# Patient Record
Sex: FEMALE | Race: WHITE | NOT HISPANIC OR LATINO | Employment: PART TIME
[De-identification: names, ages, dates, MRNs, and addresses within clinical notes are randomized per-mention and may not be internally consistent; named-entity substitution may affect disease eponyms.]

---

## 2018-03-13 ENCOUNTER — RECORDS - HEALTHEAST (OUTPATIENT)
Dept: ADMINISTRATIVE | Facility: OTHER | Age: 43
End: 2018-03-13

## 2018-03-14 ENCOUNTER — COMMUNICATION - HEALTHEAST (OUTPATIENT)
Dept: TELEHEALTH | Facility: CLINIC | Age: 43
End: 2018-03-14

## 2018-03-14 ENCOUNTER — RECORDS - HEALTHEAST (OUTPATIENT)
Dept: BONE DENSITY | Facility: CLINIC | Age: 43
End: 2018-03-14

## 2018-03-14 ENCOUNTER — RECORDS - HEALTHEAST (OUTPATIENT)
Dept: ADMINISTRATIVE | Facility: OTHER | Age: 43
End: 2018-03-14

## 2018-03-14 DIAGNOSIS — K90.0 CELIAC DISEASE: ICD-10-CM

## 2018-12-13 ENCOUNTER — RECORDS - HEALTHEAST (OUTPATIENT)
Dept: LAB | Facility: CLINIC | Age: 43
End: 2018-12-13

## 2018-12-15 LAB — BACTERIA SPEC CULT: NORMAL

## 2019-07-17 ENCOUNTER — ANESTHESIA - HEALTHEAST (OUTPATIENT)
Dept: SURGERY | Facility: HOSPITAL | Age: 44
End: 2019-07-17

## 2019-07-17 ENCOUNTER — SURGERY - HEALTHEAST (OUTPATIENT)
Dept: SURGERY | Facility: HOSPITAL | Age: 44
End: 2019-07-17

## 2019-07-17 ASSESSMENT — MIFFLIN-ST. JEOR: SCORE: 1615.23

## 2019-07-18 ENCOUNTER — COMMUNICATION - HEALTHEAST (OUTPATIENT)
Dept: SURGERY | Facility: CLINIC | Age: 44
End: 2019-07-18

## 2021-05-30 NOTE — ANESTHESIA PREPROCEDURE EVALUATION
Anesthesia Evaluation      Patient summary reviewed   No history of anesthetic complications     Airway   Mallampati: II   Pulmonary - normal exam   (+) asthma  a smoker (current every day smoker)                         Cardiovascular - negative ROS and normal exam  Exercise tolerance: > or = 4 METS  Rhythm: regular  Rate: normal,         Neuro/Psych - negative ROS     Endo/Other    (+) obesity (BMI 36.89),      GI/Hepatic/Renal - negative ROS      Other findings: Labs 7/17/19:  WBC 16.5, Hgb 13.5, Plt 244  Na 138, K 4.1, BUN 12, Cr 0.82      Dental - normal exam                        Anesthesia Plan  Planned anesthetic: general endotracheal  GETA.  Hx of PONV and plan for scopolamine patch, dexamethasone, zofran and low-dose propofol gtt (25-50 mcg/kg/min).  Ketorolac 30 mg at EOC.  ASA 2   Induction: intravenous   Anesthetic plan and risks discussed with: patient  Anesthesia plan special considerations: increased risk of difficult airway,   Post-op plan: routine recovery

## 2021-05-30 NOTE — ANESTHESIA POSTPROCEDURE EVALUATION
Patient: Rita Soares  CHOLECYSTECTOMY, LAPAROSCOPIC  Anesthesia type: general    Patient location: PACU  Last vitals:   Vitals Value Taken Time   /85 7/17/2019  3:04 PM   Temp 36.9  C (98.4  F) 7/17/2019  3:00 PM   Pulse 74 7/17/2019  3:17 PM   Resp 16 7/17/2019  3:00 PM   SpO2 96 % 7/17/2019  3:17 PM   Vitals shown include unvalidated device data.  Post vital signs: stable  Level of consciousness: awake and responds to simple questions  Post-anesthesia pain: pain controlled  Post-anesthesia nausea and vomiting: no  Pulmonary: unassisted, return to baseline  Cardiovascular: stable and blood pressure at baseline  Hydration: adequate  Anesthetic events: no    QCDR Measures:  ASA# 11 - Basia-op Cardiac Arrest: ASA11B - Patient did NOT experience unanticipated cardiac arrest  ASA# 12 - Basia-op Mortality Rate: ASA12B - Patient did NOT die  ASA# 13 - PACU Re-Intubation Rate: ASA13B - Patient did NOT require a new airway mgmt  ASA# 10 - Composite Anes Safety: ASA10A - No serious adverse event    Additional Notes:

## 2021-05-30 NOTE — ANESTHESIA CARE TRANSFER NOTE
Last vitals:   Vitals:    07/17/19 1127   BP: 135/65   Pulse: 74   Resp: 18   Temp: 37  C (98.6  F)   SpO2: 96%     Patient's level of consciousness is drowsy  Spontaneous respirations: yes  Maintains airway independently: yes  Dentition unchanged: yes  Oropharynx: oropharynx clear of all foreign objects    QCDR Measures:  ASA# 20 - Surgical Safety Checklist: WHO surgical safety checklist completed prior to induction    PQRS# 430 - Adult PONV Prevention: 4558F - Pt received => 2 anti-emetic agents (different classes) preop & intraop  ASA# 8 - Peds PONV Prevention: NA - Not pediatric patient, not GA or 2 or more risk factors NOT present  PQRS# 424 - Basia-op Temp Management: 4559F - At least one body temp DOCUMENTED => 35.5C or 95.9F within required timeframe  PQRS# 426 - PACU Transfer Protocol: - Transfer of care checklist used  ASA# 14 - Acute Post-op Pain: ASA14B - Patient did NOT experience pain >= 7 out of 10

## 2021-06-03 VITALS — WEIGHT: 215 LBS | BODY MASS INDEX: 36.7 KG/M2 | HEIGHT: 64 IN

## 2021-06-19 NOTE — LETTER
Letter by Meghan Palma RN at      Author: Meghan Palma RN Service: -- Author Type: --    Filed:  Encounter Date: 7/18/2019 Status: (Other)         July 18, 2019     Patient: Rita Soares   YOB: 1975   Date of Visit: 7/18/2019       To Whom It May Concern:    It is my medical opinion that Rita Soares can go back to work on 7/21/19 with a weight restriction of 10lbs and no twisting until August 1, 2019.     If you have any questions or concerns, please don't hesitate to call.    Sincerely,        Electronically signed by Chriss Carter MD

## 2021-07-04 ENCOUNTER — HEALTH MAINTENANCE LETTER (OUTPATIENT)
Age: 46
End: 2021-07-04

## 2021-10-24 ENCOUNTER — HEALTH MAINTENANCE LETTER (OUTPATIENT)
Age: 46
End: 2021-10-24

## 2022-07-31 ENCOUNTER — HEALTH MAINTENANCE LETTER (OUTPATIENT)
Age: 47
End: 2022-07-31

## 2022-09-08 ENCOUNTER — HOSPITAL ENCOUNTER (OUTPATIENT)
Dept: MAMMOGRAPHY | Facility: CLINIC | Age: 47
Discharge: HOME | End: 2022-09-08
Attending: FAMILY MEDICINE | Admitting: FAMILY MEDICINE
Payer: COMMERCIAL

## 2022-09-08 DIAGNOSIS — Z12.31 VISIT FOR SCREENING MAMMOGRAM: ICD-10-CM

## 2022-09-08 PROCEDURE — 77067 SCR MAMMO BI INCL CAD: CPT

## 2022-09-22 ENCOUNTER — ANCILLARY PROCEDURE (OUTPATIENT)
Dept: MAMMOGRAPHY | Facility: CLINIC | Age: 47
End: 2022-09-22
Attending: FAMILY MEDICINE
Payer: COMMERCIAL

## 2022-09-22 DIAGNOSIS — N64.89 BREAST ASYMMETRY: ICD-10-CM

## 2022-09-22 PROCEDURE — 77061 BREAST TOMOSYNTHESIS UNI: CPT | Mod: RT

## 2022-09-22 PROCEDURE — 76642 ULTRASOUND BREAST LIMITED: CPT | Mod: RT

## 2022-10-15 ENCOUNTER — HEALTH MAINTENANCE LETTER (OUTPATIENT)
Age: 47
End: 2022-10-15

## 2022-12-01 LAB
FLUAV RNA SPEC QL NAA+PROBE: NEGATIVE
FLUBV RNA RESP QL NAA+PROBE: NEGATIVE
RSV RNA SPEC NAA+PROBE: NEGATIVE
SARS-COV-2 RNA RESP QL NAA+PROBE: NEGATIVE

## 2022-12-01 PROCEDURE — 93005 ELECTROCARDIOGRAM TRACING: CPT | Performed by: EMERGENCY MEDICINE

## 2022-12-01 PROCEDURE — 87637 SARSCOV2&INF A&B&RSV AMP PRB: CPT | Performed by: EMERGENCY MEDICINE

## 2022-12-01 PROCEDURE — 99285 EMERGENCY DEPT VISIT HI MDM: CPT | Mod: 25

## 2022-12-01 PROCEDURE — C9803 HOPD COVID-19 SPEC COLLECT: HCPCS

## 2022-12-02 ENCOUNTER — APPOINTMENT (OUTPATIENT)
Dept: RADIOLOGY | Facility: CLINIC | Age: 47
End: 2022-12-02
Attending: EMERGENCY MEDICINE
Payer: COMMERCIAL

## 2022-12-02 ENCOUNTER — APPOINTMENT (OUTPATIENT)
Dept: CT IMAGING | Facility: CLINIC | Age: 47
End: 2022-12-02
Attending: EMERGENCY MEDICINE
Payer: COMMERCIAL

## 2022-12-02 ENCOUNTER — HOSPITAL ENCOUNTER (EMERGENCY)
Facility: CLINIC | Age: 47
Discharge: HOME | End: 2022-12-02
Attending: EMERGENCY MEDICINE | Admitting: EMERGENCY MEDICINE
Payer: COMMERCIAL

## 2022-12-02 VITALS
SYSTOLIC BLOOD PRESSURE: 147 MMHG | BODY MASS INDEX: 32.44 KG/M2 | RESPIRATION RATE: 20 BRPM | OXYGEN SATURATION: 99 % | HEIGHT: 64 IN | HEART RATE: 99 BPM | WEIGHT: 190 LBS | TEMPERATURE: 97.1 F | DIASTOLIC BLOOD PRESSURE: 79 MMHG

## 2022-12-02 DIAGNOSIS — J20.8 ACUTE BRONCHITIS DUE TO OTHER SPECIFIED ORGANISMS: ICD-10-CM

## 2022-12-02 LAB
ALBUMIN SERPL-MCNC: 3.8 G/DL (ref 3.5–5)
ALP SERPL-CCNC: 86 U/L (ref 45–120)
ALT SERPL W P-5'-P-CCNC: 18 U/L (ref 0–45)
ANION GAP SERPL CALCULATED.3IONS-SCNC: 8 MMOL/L (ref 5–18)
AST SERPL W P-5'-P-CCNC: 11 U/L (ref 0–40)
ATRIAL RATE - MUSE: 86 BPM
BASOPHILS # BLD AUTO: 0.1 10E3/UL (ref 0–0.2)
BASOPHILS NFR BLD AUTO: 0 %
BILIRUB SERPL-MCNC: 0.1 MG/DL (ref 0–1)
BUN SERPL-MCNC: 20 MG/DL (ref 8–22)
CALCIUM SERPL-MCNC: 8.4 MG/DL (ref 8.5–10.5)
CHLORIDE BLD-SCNC: 106 MMOL/L (ref 98–107)
CO2 SERPL-SCNC: 24 MMOL/L (ref 22–31)
CREAT SERPL-MCNC: 0.78 MG/DL (ref 0.6–1.1)
DIASTOLIC BLOOD PRESSURE - MUSE: NORMAL MMHG
EOSINOPHIL # BLD AUTO: 0.1 10E3/UL (ref 0–0.7)
EOSINOPHIL NFR BLD AUTO: 0 %
ERYTHROCYTE [DISTWIDTH] IN BLOOD BY AUTOMATED COUNT: 14 % (ref 10–15)
GFR SERPL CREATININE-BSD FRML MDRD: >90 ML/MIN/1.73M2
GLUCOSE BLD-MCNC: 122 MG/DL (ref 70–125)
HCT VFR BLD AUTO: 45.7 % (ref 35–47)
HGB BLD-MCNC: 15.2 G/DL (ref 11.7–15.7)
IMM GRANULOCYTES # BLD: 0.2 10E3/UL
IMM GRANULOCYTES NFR BLD: 1 %
INTERPRETATION ECG - MUSE: NORMAL
LIPASE SERPL-CCNC: 12 U/L (ref 0–52)
LYMPHOCYTES # BLD AUTO: 1.9 10E3/UL (ref 0.8–5.3)
LYMPHOCYTES NFR BLD AUTO: 8 %
MCH RBC QN AUTO: 30.8 PG (ref 26.5–33)
MCHC RBC AUTO-ENTMCNC: 33.3 G/DL (ref 31.5–36.5)
MCV RBC AUTO: 93 FL (ref 78–100)
MONOCYTES # BLD AUTO: 0.6 10E3/UL (ref 0–1.3)
MONOCYTES NFR BLD AUTO: 3 %
NEUTROPHILS # BLD AUTO: 21.2 10E3/UL (ref 1.6–8.3)
NEUTROPHILS NFR BLD AUTO: 88 %
NRBC # BLD AUTO: 0 10E3/UL
NRBC BLD AUTO-RTO: 0 /100
P AXIS - MUSE: 65 DEGREES
PLATELET # BLD AUTO: 281 10E3/UL (ref 150–450)
POTASSIUM BLD-SCNC: 4.4 MMOL/L (ref 3.5–5)
PR INTERVAL - MUSE: 136 MS
PROT SERPL-MCNC: 6.3 G/DL (ref 6–8)
QRS DURATION - MUSE: 92 MS
QT - MUSE: 366 MS
QTC - MUSE: 437 MS
R AXIS - MUSE: 31 DEGREES
RBC # BLD AUTO: 4.93 10E6/UL (ref 3.8–5.2)
SODIUM SERPL-SCNC: 138 MMOL/L (ref 136–145)
SYSTOLIC BLOOD PRESSURE - MUSE: NORMAL MMHG
T AXIS - MUSE: 13 DEGREES
TROPONIN I SERPL-MCNC: <0.01 NG/ML (ref 0–0.29)
VENTRICULAR RATE- MUSE: 86 BPM
WBC # BLD AUTO: 24 10E3/UL (ref 4–11)

## 2022-12-02 PROCEDURE — 84484 ASSAY OF TROPONIN QUANT: CPT | Performed by: EMERGENCY MEDICINE

## 2022-12-02 PROCEDURE — 250N000009 HC RX 250: Performed by: EMERGENCY MEDICINE

## 2022-12-02 PROCEDURE — 85025 COMPLETE CBC W/AUTO DIFF WBC: CPT | Performed by: EMERGENCY MEDICINE

## 2022-12-02 PROCEDURE — 250N000011 HC RX IP 250 OP 636: Performed by: EMERGENCY MEDICINE

## 2022-12-02 PROCEDURE — 71045 X-RAY EXAM CHEST 1 VIEW: CPT

## 2022-12-02 PROCEDURE — 94640 AIRWAY INHALATION TREATMENT: CPT

## 2022-12-02 PROCEDURE — 83690 ASSAY OF LIPASE: CPT | Performed by: EMERGENCY MEDICINE

## 2022-12-02 PROCEDURE — 71275 CT ANGIOGRAPHY CHEST: CPT

## 2022-12-02 PROCEDURE — 271N000002 HC RX 271: Performed by: EMERGENCY MEDICINE

## 2022-12-02 PROCEDURE — 36415 COLL VENOUS BLD VENIPUNCTURE: CPT | Performed by: EMERGENCY MEDICINE

## 2022-12-02 PROCEDURE — 80053 COMPREHEN METABOLIC PANEL: CPT | Performed by: EMERGENCY MEDICINE

## 2022-12-02 RX ORDER — IOPAMIDOL 755 MG/ML
75 INJECTION, SOLUTION INTRAVASCULAR ONCE
Status: COMPLETED | OUTPATIENT
Start: 2022-12-02 | End: 2022-12-02

## 2022-12-02 RX ORDER — ALBUTEROL SULFATE 0.83 MG/ML
2.5 SOLUTION RESPIRATORY (INHALATION)
Status: DISCONTINUED | OUTPATIENT
Start: 2022-12-02 | End: 2022-12-02 | Stop reason: HOSPADM

## 2022-12-02 RX ORDER — INHALER, ASSIST DEVICES
1 SPACER (EA) MISCELLANEOUS ONCE
Status: COMPLETED | OUTPATIENT
Start: 2022-12-02 | End: 2022-12-02

## 2022-12-02 RX ORDER — IPRATROPIUM BROMIDE AND ALBUTEROL SULFATE 2.5; .5 MG/3ML; MG/3ML
1 SOLUTION RESPIRATORY (INHALATION) EVERY 6 HOURS PRN
Qty: 90 ML | Refills: 0 | Status: SHIPPED | OUTPATIENT
Start: 2022-12-02

## 2022-12-02 RX ORDER — IPRATROPIUM BROMIDE AND ALBUTEROL SULFATE 2.5; .5 MG/3ML; MG/3ML
3 SOLUTION RESPIRATORY (INHALATION) ONCE
Status: COMPLETED | OUTPATIENT
Start: 2022-12-02 | End: 2022-12-02

## 2022-12-02 RX ORDER — FLUCONAZOLE 150 MG/1
150 TABLET ORAL ONCE
Qty: 2 TABLET | Refills: 0 | Status: SHIPPED | OUTPATIENT
Start: 2022-12-02 | End: 2022-12-02

## 2022-12-02 RX ORDER — ALBUTEROL SULFATE 90 UG/1
2 AEROSOL, METERED RESPIRATORY (INHALATION) EVERY 6 HOURS PRN
Qty: 18 G | Refills: 0 | Status: SHIPPED | OUTPATIENT
Start: 2022-12-02

## 2022-12-02 RX ORDER — METHYLPREDNISOLONE SODIUM SUCCINATE 125 MG/2ML
125 INJECTION, POWDER, LYOPHILIZED, FOR SOLUTION INTRAMUSCULAR; INTRAVENOUS ONCE
Status: COMPLETED | OUTPATIENT
Start: 2022-12-02 | End: 2022-12-02

## 2022-12-02 RX ADMIN — Medication 1 EACH: at 03:42

## 2022-12-02 RX ADMIN — METHYLPREDNISOLONE SODIUM SUCCINATE 125 MG: 125 INJECTION, POWDER, FOR SOLUTION INTRAMUSCULAR; INTRAVENOUS at 02:08

## 2022-12-02 RX ADMIN — IOPAMIDOL 75 ML: 755 INJECTION, SOLUTION INTRAVENOUS at 02:50

## 2022-12-02 RX ADMIN — IPRATROPIUM BROMIDE AND ALBUTEROL SULFATE 3 ML: 2.5; .5 SOLUTION RESPIRATORY (INHALATION) at 02:58

## 2022-12-02 ASSESSMENT — ENCOUNTER SYMPTOMS
WHEEZING: 1
COUGH: 1
CHEST TIGHTNESS: 1

## 2022-12-02 ASSESSMENT — ACTIVITIES OF DAILY LIVING (ADL): ADLS_ACUITY_SCORE: 35

## 2022-12-02 NOTE — ED TRIAGE NOTES
Pt presents to the ED with c/o worsening chest pain. Pt had cough for the past 2 weeks and then developed worsening mid sternal CP this evening. Worse with laying down. Denies fevers.

## 2022-12-02 NOTE — ED PROVIDER NOTES
NAME: Rita Soares  AGE: 47 year old female  YOB: 1975  MRN: [id number]  EVALUATION DATE & TIME: 12/2/2022  1:19 AM    PCP: Meghan Keenan    ED PROVIDER: Mando Clinton M.D.      Chief Complaint   Patient presents with     Chest Pain     Cough     FINAL IMPRESSION:  1. Acute bronchitis due to other specified organisms      MEDICAL DECISION MAKING:    3:37 AM I met with the patient, obtained history, performed an initial exam, and discussed options and plan for diagnostics and treatment here in the ED.   Patient was clinically assessed and consented to treatment. After assessment, medical decision making and workup were discussed with the patient. The patient was agreeable to plan for testing, workup, and treatment.  Pertinent Labs & Imaging studies reviewed. (See chart for details)       Medical Decision Making    History:    Supplemental history from: N/A    External Record(s) reviewed: Documented in HPI, if applicable.    Work Up:    Chart documentation includes differential considered and any EKGs or imaging interpreted by provider.    In additional to work up documented, I considered the following work up: See chart documentation, if applicable.    External consultation:    Discussion of management with another provider: See chart documentation, if applicable    Complicating factors:    Care impacted by chronic illness: None    Care affected by social determinants of health: N/A    Disposition considerations: Discharge. I prescribed additional prescription strength medication(s) as charted. I considered admission, but discharged patient after significant clinical improvement.    Rita Soares is a 47 year old female who presents with chest pain and cough.   Differential diagnosis includes but not limited to COPD exacerbation, bronchitis, pneumonia, asthma exacerbation, pulm embolism, acute coronary syndrome..  Patient reports being otherwise healthy however does smoke and likely  with COPD.  She has had 2 months of cough that is been dry and persistent.  Patient has seen her primary doctor and started a prednisone course with azithromycin 2 weeks ago however cough persisted.  She then was recently started on a prolonged steroid course with taper which she is currently in the middle of along with doxycycline and inhalers.  She reports continued cough but no fevers, no chest pain but a little bit of chest tightness and intermittent wheezing or raspy breathing.  She denies any acute shortness of breath or lightheadedness.  Patient is still smoking and feels her symptoms are likely consistent with acute on chronic bronchitis.  Patient is on steroid course presently however has trouble with inhalers and do not feel they help.  She is also on doxycycline which would be appropriate for this.  Patient given DuoNeb and Solu-Medrol with resolution of wheezes and prolonged expiration with no further chest tightness.  EKG from triage was unremarkable for any acute ischemia except for PVC noted and troponin was negative, metabolic panel normal, CBC did show leukocytosis though patient has had been on 2 courses steroids and now is on prolonged taper after the prior course.  Lipase was normal and chest x-ray was clear.  Patient could possibly pulm embolism will be sent for CTA.  Patient reassured and following nebulized treatments she was feeling resolution of her symptoms.  She felt much better and was watched for any rebound.  Solu-Medrol prevent rebound and after negative CTA patient was reassured.  She would like to continue with such treatment at home and I did write her for a nebulizer and ampules.  As well we discussed her inhaler use which she reports not really having much effect from them that we discussed whether she uses a spacer which she does not and she is not familiar with how to use the inhaler and time it correctly.  She reports usually it just makes her mouth dry.  Spacer was given to  her to help with the inhaler and patient will continue her steroid course to finish this off as well as the doxycycline.  She will be given nebulizer to be used as needed along with follow-up to primary physician.    0 minutes of critical care time    MEDICATIONS GIVEN IN THE EMERGENCY:  Medications   albuterol (PROVENTIL) neb solution 2.5 mg (has no administration in time range)   ipratropium - albuterol 0.5 mg/2.5 mg/3 mL (DUONEB) neb solution 3 mL (3 mLs Nebulization Given 12/2/22 0258)   methylPREDNISolone sodium succinate (solu-MEDROL) injection 125 mg (125 mg Intravenous Given 12/2/22 0208)   iopamidol (ISOVUE-370) solution 75 mL (75 mLs Intravenous Given 12/2/22 0250)   aerochamber with mouthpiece (NO mask) - > 5 years 1 each (1 each Inhalation Given 12/2/22 0342)       NEW PRESCRIPTIONS STARTED AT TODAY'S ER VISIT:  Discharge Medication List as of 12/2/2022  3:48 AM      START taking these medications    Details   albuterol (PROAIR HFA/PROVENTIL HFA/VENTOLIN HFA) 108 (90 Base) MCG/ACT inhaler Inhale 2 puffs into the lungs every 6 hours as needed for shortness of breath / dyspnea or wheezing, Disp-18 g, R-0, Local PrintPharmacy may dispense brand covered by insurance (Proair, or proventil or ventolin or generic albuterol inhaler)      fluconazole (DIFLUCAN) 150 MG tablet Take 1 tablet (150 mg) by mouth once for 1 dose If symptoms not improved by day 3 after the first dose may take second dose., Disp-2 tablet, R-0, Local Print      ipratropium - albuterol 0.5 mg/2.5 mg/3 mL (DUONEB) 0.5-2.5 (3) MG/3ML neb solution Take 1 vial (3 mLs) by nebulization every 6 hours as needed for shortness of breath / dyspnea or wheezing, Disp-90 mL, R-0, Local Print      Respiratory Therapy Supplies (NEBULIZER) ASHANTI Inhale 1 ampule into the lungs 4 times daily as needed (Wheezing, cough, shortness of breath), Disp-1 each, R-0, Local PrintInclude tubing and mask for age please.                 =================================================================    HPI    Patient information was obtained from: Patient    Use of : N/A         Rita Soares is a 47 year old female with a past medical history of smoking, who presents chest pain and cough.  Patient does not relate chest pain but more by chest tightness.  Patient reports that for 2 months she has had dry hacking cough after being ill 2 months ago.  She reports no fevers or chills at this time, no sputum production, no hemoptysis, no leg swelling or tenderness.  She denies any long plane flights or car trips.  Patient does report seeing her primary clinic and was started on 5 days of prednisone along with azithromycin which she does not believe helped but after finishing that they continued on longer course of steroids where she is in the middle of today on presentation.  She is also on doxycycline that she is in the middle of the course and was given an inhaler.  Patient reports the inhaler does not seem to help but mainly just makes her mouth dry.  She does not believe it is effective but also notes wheezing and raspy breathing with prolonged expiration occasionally and feels the inhaler should help.  Patient reports she was started on treatment for bronchitis but not improving.      REVIEW OF SYSTEMS   Review of Systems   Respiratory: Positive for cough, chest tightness and wheezing.    All other systems reviewed and are negative.       PAST MEDICAL HISTORY:  No past medical history on file.    PAST SURGICAL HISTORY:  Past Surgical History:   Procedure Laterality Date     DILATION AND CURETTAGE       LAPAROSCOPIC CHOLECYSTECTOMY N/A 7/17/2019    Procedure: CHOLECYSTECTOMY, LAPAROSCOPIC;  Surgeon: Chriss Carter MD;  [facility]: [facility];  Service: General     LAPAROSCOPIC CYSTECTOMY OVARIAN (ONCOLOGY)         CURRENT MEDICATIONS:      Current Facility-Administered Medications:      albuterol (PROVENTIL) neb solution  2.5 mg, 2.5 mg, Nebulization, Once PRN, Mando Clinton MD    Current Outpatient Medications:      albuterol (PROAIR HFA/PROVENTIL HFA/VENTOLIN HFA) 108 (90 Base) MCG/ACT inhaler, Inhale 2 puffs into the lungs every 6 hours as needed for shortness of breath / dyspnea or wheezing, Disp: 18 g, Rfl: 0     fluconazole (DIFLUCAN) 150 MG tablet, Take 1 tablet (150 mg) by mouth once for 1 dose If symptoms not improved by day 3 after the first dose may take second dose., Disp: 2 tablet, Rfl: 0     ipratropium - albuterol 0.5 mg/2.5 mg/3 mL (DUONEB) 0.5-2.5 (3) MG/3ML neb solution, Take 1 vial (3 mLs) by nebulization every 6 hours as needed for shortness of breath / dyspnea or wheezing, Disp: 90 mL, Rfl: 0     Respiratory Therapy Supplies (NEBULIZER) ASHANTI, Inhale 1 ampule into the lungs 4 times daily as needed (Wheezing, cough, shortness of breath), Disp: 1 each, Rfl: 0     NO ACTIVE MEDICATIONS, , Disp: , Rfl:      Prenatal vitamin  s (DIS) TABS, Take 1 tablet by mouth daily, Disp: 90 tablet, Rfl: 3     varenicline (CHANTIX STARTING MONTH DONNELL) 0.5 MG X 11 & 1 MG X 42 tablet, Take 0.5 mg tab daily for 3 days, then 0.5 mg tab twice daily for 4 days, then 1 mg twice daily., Disp: 53 tablet, Rfl: 0     varenicline (CHANTIX) 1 MG tablet, Take 1 tablet by mouth 2 times daily., Disp: 56 tablet, Rfl: 2    ALLERGIES:  Allergies   Allergen Reactions     Gluten Meal Nausea and Vomiting and Diarrhea       FAMILY HISTORY:  No family history on file.    SOCIAL HISTORY:   Social History     Socioeconomic History     Marital status:    Tobacco Use     Smoking status: Every Day     Packs/day: 5.00     Years: 20.00     Pack years: 100.00     Types: Cigarettes     Smokeless tobacco: Current   Substance and Sexual Activity     Alcohol use: Never     Drug use: Never     Sexual activity: Yes     Partners: Male     Birth control/protection: Condom   Other Topics Concern     Parent/sibling w/ CABG, MI or angioplasty before 65F  "55M? No       PHYSICAL EXAM:    Vitals: BP (!) 147/79   Pulse 99   Temp 97.1  F (36.2  C) (Temporal)   Resp 20   Ht 1.626 m (5' 4\")   Wt 86.2 kg (190 lb)   SpO2 99%   BMI 32.61 kg/m     Physical Exam  Vitals and nursing note reviewed.   Constitutional:       General: She is not in acute distress.     Appearance: She is well-developed and normal weight. She is not ill-appearing or toxic-appearing.   Neck:      Vascular: No JVD.   Cardiovascular:      Rate and Rhythm: Normal rate and regular rhythm.      Heart sounds: Normal heart sounds.   Pulmonary:      Effort: Pulmonary effort is normal. No accessory muscle usage or respiratory distress.      Breath sounds: No stridor. Examination of the right-lower field reveals decreased breath sounds and wheezing. Examination of the left-lower field reveals decreased breath sounds and wheezing. Decreased breath sounds and wheezing present.   Chest:      Chest wall: No tenderness.   Abdominal:      Tenderness: There is no abdominal tenderness.   Musculoskeletal:      Right lower leg: No tenderness. No edema.      Left lower leg: No tenderness. No edema.   Skin:     Coloration: Skin is not cyanotic.   Neurological:      General: No focal deficit present.      Mental Status: She is alert.   Psychiatric:         Behavior: Behavior normal.           LAB:  All pertinent labs reviewed and interpreted.  Labs Ordered and Resulted from Time of ED Arrival to Time of ED Departure   COMPREHENSIVE METABOLIC PANEL - Abnormal       Result Value    Sodium 138      Potassium 4.4      Chloride 106      Carbon Dioxide (CO2) 24      Anion Gap 8      Urea Nitrogen 20      Creatinine 0.78      Calcium 8.4 (*)     Glucose 122      Alkaline Phosphatase 86      AST 11      ALT 18      Protein Total 6.3      Albumin 3.8      Bilirubin Total 0.1      GFR Estimate >90     CBC WITH PLATELETS AND DIFFERENTIAL - Abnormal    WBC Count 24.0 (*)     RBC Count 4.93      Hemoglobin 15.2      Hematocrit 45.7 "      MCV 93      MCH 30.8      MCHC 33.3      RDW 14.0      Platelet Count 281      % Neutrophils 88      % Lymphocytes 8      % Monocytes 3      % Eosinophils 0      % Basophils 0      % Immature Granulocytes 1      NRBCs per 100 WBC 0      Absolute Neutrophils 21.2 (*)     Absolute Lymphocytes 1.9      Absolute Monocytes 0.6      Absolute Eosinophils 0.1      Absolute Basophils 0.1      Absolute Immature Granulocytes 0.2      Absolute NRBCs 0.0     INFLUENZA A/B & SARS-COV2 PCR MULTIPLEX - Normal    Influenza A PCR Negative      Influenza B PCR Negative      RSV PCR Negative      SARS CoV2 PCR Negative     LIPASE - Normal    Lipase 12     TROPONIN I - Normal    Troponin I <0.01         RADIOLOGY:  CT Chest Pulmonary Embolism w Contrast   Final Result   IMPRESSION:   1.  No infiltrate, pleural effusion or pulmonary embolism.   2.  Subcentimeter biapical nodules.      REFERENCE:   Guidelines for Management of Incidental Pulmonary Nodules Detected on CT Images: From the Fleischner Society 2017.    Guidelines apply to incidental nodules in patients who are 35 years or older.   Guidelines do not apply to lung cancer screening, patients with immunosuppression, or patients with known primary cancer.      MULTIPLE NODULES   Nodule size <6 mm   Low-risk patients: No follow-up needed.   High-risk patients: Optional follow-up at 12 months.         XR Chest Port 1 View   Final Result   IMPRESSION: Negative chest.          EKG:   Performed at: 9:21 PM on December 2, 2022.  Impression: Sinus rhythm with PVC, no signs of acute ST elevation ischemia, no irregular rhythm.  Rate: 86 bpm  Rhythm: Sinus rhythm  QRS Interval: 92 ms  QTc Interval: 437 ms  Comparison: Comparison prior EKG from July 2019 with similar morphology with QRS of PVC new.  I have independently reviewed and interpreted the EKG(s) documented above.     PROCEDURES:   Procedures       Mando Clinton M.D.  Emergency Medicine  [facility]  Department       Wall, Mando Galvan MD  12/02/22 7135

## 2023-08-20 ENCOUNTER — HEALTH MAINTENANCE LETTER (OUTPATIENT)
Age: 48
End: 2023-08-20

## 2024-01-07 ENCOUNTER — HEALTH MAINTENANCE LETTER (OUTPATIENT)
Age: 49
End: 2024-01-07

## 2024-03-06 ENCOUNTER — APPOINTMENT (OUTPATIENT)
Dept: URBAN - METROPOLITAN AREA CLINIC 260 | Age: 49
Setting detail: DERMATOLOGY
End: 2024-03-06

## 2024-03-06 VITALS — WEIGHT: 190 LBS | HEIGHT: 64 IN

## 2024-03-06 DIAGNOSIS — L72.8 OTHER FOLLICULAR CYSTS OF THE SKIN AND SUBCUTANEOUS TISSUE: ICD-10-CM

## 2024-03-06 DIAGNOSIS — D22 MELANOCYTIC NEVI: ICD-10-CM

## 2024-03-06 PROBLEM — D22.39 MELANOCYTIC NEVI OF OTHER PARTS OF FACE: Status: ACTIVE | Noted: 2024-03-06

## 2024-03-06 PROBLEM — D23.61 OTHER BENIGN NEOPLASM OF SKIN OF RIGHT UPPER LIMB, INCLUDING SHOULDER: Status: ACTIVE | Noted: 2024-03-06

## 2024-03-06 PROCEDURE — 99203 OFFICE O/P NEW LOW 30 MIN: CPT

## 2024-03-06 PROCEDURE — OTHER PHOTO-DOCUMENTATION: OTHER

## 2024-03-06 PROCEDURE — OTHER CONSULTATION EXCISION: OTHER

## 2024-03-06 PROCEDURE — OTHER MIPS QUALITY: OTHER

## 2024-03-06 PROCEDURE — OTHER COUNSELING: OTHER

## 2024-03-06 ASSESSMENT — LOCATION DETAILED DESCRIPTION DERM
LOCATION DETAILED: LEFT MEDIAL MANDIBULAR CHEEK
LOCATION DETAILED: LEFT SUPERIOR FOREHEAD

## 2024-03-06 ASSESSMENT — LOCATION SIMPLE DESCRIPTION DERM
LOCATION SIMPLE: LEFT CHEEK
LOCATION SIMPLE: LEFT FOREHEAD

## 2024-03-06 ASSESSMENT — LOCATION ZONE DERM: LOCATION ZONE: FACE

## 2024-03-06 NOTE — HPI: CYST
How Severe Is Your Cyst?: moderate
Is This A New Presentation, Or A Follow-Up?: Cyst
Additional History: Have done a few courses of Doxycycline and different topicals- unsure which ones and nothing seems to improve it.

## 2024-03-06 NOTE — PROCEDURE: CONSULTATION EXCISION
Detail Level: Detailed
Size Of Lesion: 1.3
Other Plan: Excision with Dr. Ernandez recommended. Will send to Dr. Ernandez’s team to schedule. Patient to follow up in 2 weeks if she does not hear from Dr. Ernandez’s office
X Size Of Lesion In Cm (Optional): 1.5

## 2024-03-06 NOTE — PROCEDURE: COUNSELING
Detail Level: Detailed
Sunscreen Recommendations: disc that patient would like to have her mole on her right upper chest excised. Will plan to do this at her nov. EC
Detail Level: Zone

## 2024-04-12 ENCOUNTER — RX ONLY (RX ONLY)
Age: 49
End: 2024-04-12

## 2024-04-12 RX ORDER — DOXYCYCLINE 100 MG/1
CAPSULE ORAL
Qty: 28 | Refills: 0 | Status: ERX | COMMUNITY
Start: 2024-04-12

## 2024-04-25 ENCOUNTER — TELEPHONE (OUTPATIENT)
Dept: CONSULT | Facility: CLINIC | Age: 49
End: 2024-04-25
Payer: COMMERCIAL

## 2024-04-25 NOTE — TELEPHONE ENCOUNTER
Rita received my contact information from her sister who I previously saw in the Genetics clinic. 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 We discussed a virtual genetic counseling appointment and Rita would like to schedule. I told her I would ask our  to reach out to her to schedule a virtual visit with me.     Mariama Rosado MS, [facility]  Licensed Genetic Counselor  [facility]  Phone: [phone]  Fax: [phone]

## 2024-04-26 ENCOUNTER — TELEPHONE (OUTPATIENT)
Dept: CONSULT | Facility: CLINIC | Age: 49
End: 2024-04-26
Payer: COMMERCIAL

## 2024-05-06 ENCOUNTER — APPOINTMENT (OUTPATIENT)
Dept: URBAN - METROPOLITAN AREA CLINIC 259 | Age: 49
Setting detail: DERMATOLOGY
End: 2024-05-06

## 2024-05-06 DIAGNOSIS — L72.8 OTHER FOLLICULAR CYSTS OF THE SKIN AND SUBCUTANEOUS TISSUE: ICD-10-CM

## 2024-05-06 PROCEDURE — 11442 EXC FACE-MM B9+MARG 1.1-2 CM: CPT

## 2024-05-06 PROCEDURE — 12052 INTMD RPR FACE/MM 2.6-5.0 CM: CPT

## 2024-05-06 PROCEDURE — OTHER COUNSELING: OTHER

## 2024-05-06 PROCEDURE — OTHER MIPS QUALITY: OTHER

## 2024-05-06 PROCEDURE — OTHER EXCISION: OTHER

## 2024-05-06 ASSESSMENT — LOCATION ZONE DERM: LOCATION ZONE: FACE

## 2024-05-06 ASSESSMENT — LOCATION DETAILED DESCRIPTION DERM: LOCATION DETAILED: LEFT MEDIAL MANDIBULAR CHEEK

## 2024-05-06 ASSESSMENT — LOCATION SIMPLE DESCRIPTION DERM: LOCATION SIMPLE: LEFT CHEEK

## 2024-05-06 NOTE — PROCEDURE: EXCISION
X Size Of Lesion In Cm (Optional): 0.8
Keystone Flap Text: The defect edges were debeveled with a #15 scalpel blade. Given the location of the defect, shape of the defect a keystone flap was deemed most appropriate. Using a sterile surgical marker, an appropriate keystone flap was drawn incorporating the defect, outlining the appropriate donor tissue and placing the expected incisions within the relaxed skin tension lines where possible. The area thus outlined was incised deep to adipose tissue with a #15 scalpel blade. The skin margins were undermined to an appropriate distance in all directions around the primary defect and laterally outward around the flap utilizing iris scissors. Following this, the designed flap was carried into the primary defect and sutured into place.
Patient Will Remove Sutures At Home?: No
Complex Repair And Split-Thickness Skin Graft Text: The defect edges were debeveled with a #15 scalpel blade.  The primary defect was closed partially with a complex linear closure.  Given the location of the defect, shape of the defect and the proximity to free margins a split thickness skin graft was deemed most appropriate to repair the remaining defect.  The graft was trimmed to fit the size of the remaining defect.  The graft was then placed in the primary defect, oriented appropriately, and sutured into place.
Primary Defect Length (In Cm): 0
O-L Flap Text: The defect edges were debeveled with a #15 scalpel blade. Given the location of the defect, shape of the defect and the proximity to free margins an O-L flap was deemed most appropriate. Using a sterile surgical marker, an appropriate advancement flap was drawn incorporating the defect and placing the expected incisions within the relaxed skin tension lines where possible. The area thus outlined was incised deep to adipose tissue with a #15 scalpel blade. The skin margins were undermined to an appropriate distance in all directions utilizing iris scissors. Following this, the designed flap was advanced and carried over into the primary defect and sutured into place.
M-Plasty Intermediate Repair Preamble Text (Leave Blank If You Do Not Want): Undermining was performed with blunt dissection.
Complex Repair And Modified Advancement Flap Text: The defect edges were debeveled with a #15 scalpel blade.  The primary defect was closed partially with a complex linear closure.  Given the location of the remaining defect, shape of the defect and the proximity to free margins a modified advancement flap was deemed most appropriate for complete closure of the defect.  Using a sterile surgical marker, an appropriate advancement flap was drawn incorporating the defect and placing the expected incisions within the relaxed skin tension lines where possible. The area thus outlined was incised deep to adipose tissue with a #15 scalpel blade. The skin margins were undermined to an appropriate distance in all directions utilizing iris scissors and carried over to close the primary defect.
Hemostasis: Electrocautery
Melolabial Transposition Flap Text: The defect edges were debeveled with a #15 scalpel blade. Given the location of the defect and the proximity to free margins a melolabial flap was deemed most appropriate. Using a sterile surgical marker, an appropriate melolabial transposition flap was drawn incorporating the defect. The area thus outlined was incised deep to adipose tissue with a #15 scalpel blade. The skin margins were undermined to an appropriate distance in all directions utilizing iris scissors. Following this, the designed flap was carried over into the primary defect and sutured into place.
Fusiform Excision Additional Text (Leave Blank If You Do Not Want): The margin was drawn around the clinically apparent lesion.  A fusiform shape was then drawn on the skin incorporating the lesion and margins.  Incisions were then made along these lines to the appropriate tissue plane and the lesion was extirpated.
Mastoid Interpolation Flap Text: A decision was made to reconstruct the defect utilizing an interpolation axial flap and a staged reconstruction.  A telfa template was made of the defect.  This telfa template was then used to outline the mastoid interpolation flap.  The donor area for the pedicle flap was then injected with anesthesia.  The flap was excised through the skin and subcutaneous tissue down to the layer of the underlying musculature.  The pedicle flap was carefully excised within this deep plane to maintain its blood supply.  The edges of the donor site were undermined.   The donor site was closed in a primary fashion.  The pedicle was then rotated into position and sutured.  Once the tube was sutured into place, adequate blood supply was confirmed with blanching and refill.  The pedicle was then wrapped with xeroform gauze and dressed appropriately with a telfa and gauze bandage to ensure continued blood supply and protect the attached pedicle.
Paramedian Forehead Flap Text: A decision was made to reconstruct the defect utilizing an interpolation axial flap and a staged reconstruction.  A telfa template was made of the defect.  This telfa template was then used to outline the paramedian forehead pedicle flap.  The donor area for the pedicle flap was then injected with anesthesia.  The flap was excised through the skin and subcutaneous tissue down to the layer of the underlying musculature.  The pedicle flap was carefully excised within this deep plane to maintain its blood supply.  The edges of the donor site were undermined.   The donor site was closed in a primary fashion.  The pedicle was then rotated into position and sutured.  Once the tube was sutured into place, adequate blood supply was confirmed with blanching and refill.  The pedicle was then wrapped with xeroform gauze and dressed appropriately with a telfa and gauze bandage to ensure continued blood supply and protect the attached pedicle.
Double O-Z Flap Text: The defect edges were debeveled with a #15 scalpel blade. Given the location of the defect, shape of the defect and the proximity to free margins a Double O-Z flap was deemed most appropriate. Using a sterile surgical marker, an appropriate transposition flap was drawn incorporating the defect and placing the expected incisions within the relaxed skin tension lines where possible. The area thus outlined was incised deep to adipose tissue with a #15 scalpel blade. The skin margins were undermined to an appropriate distance in all directions utilizing iris scissors. Following this, the designed flap was carried over into the primary defect and sutured into place.
Complex Repair And Dermal Autograft Text: The defect edges were debeveled with a #15 scalpel blade.  The primary defect was closed partially with a complex linear closure.  Given the location of the defect, shape of the defect and the proximity to free margins an dermal autograft was deemed most appropriate to repair the remaining defect.  The graft was trimmed to fit the size of the remaining defect.  The graft was then placed in the primary defect, oriented appropriately, and sutured into place.
Anesthesia Volume In Cc: 3
O-Z Plasty Text: The defect edges were debeveled with a #15 scalpel blade. Given the location of the defect, shape of the defect and the proximity to free margins an O-Z plasty (double transposition flap) was deemed most appropriate. Using a sterile surgical marker, the appropriate transposition flaps were drawn incorporating the defect and placing the expected incisions within the relaxed skin tension lines where possible. The area thus outlined was incised deep to adipose tissue with a #15 scalpel blade. The skin margins were undermined to an appropriate distance in all directions utilizing iris scissors. Hemostasis was achieved with electrocautery. The flaps were then transposed and carried over into place, one clockwise and the other counterclockwise, and anchored with interrupted buried subcutaneous sutures.
Saucerization Excision Additional Text (Leave Blank If You Do Not Want): The margin was drawn around the clinically apparent lesion.  Incisions were then made along these lines, in a tangential fashion, to the appropriate tissue plane and the lesion was extirpated.
Estimated Blood Loss (Cc): minimal
V-Y Plasty Text: The defect edges were debeveled with a #15 scalpel blade. Given the location of the defect, shape of the defect and the proximity to free margins an V-Y advancement flap was deemed most appropriate. Using a sterile surgical marker, an appropriate advancement flap was drawn incorporating the defect and placing the expected incisions within the relaxed skin tension lines where possible. The area thus outlined was incised deep to adipose tissue with a #15 scalpel blade. The skin margins were undermined to an appropriate distance in all directions utilizing iris scissors. Following this, the designed flap was advanced and carried over into the primary defect and sutured into place.
Add Superficial Fascia When Documenting Dermal Sutures?: Yes
Complex Repair And O-T Advancement Flap Text: The defect edges were debeveled with a #15 scalpel blade.  The primary defect was closed partially with a complex linear closure.  Given the location of the remaining defect, shape of the defect and the proximity to free margins an O-T advancement flap was deemed most appropriate for complete closure of the defect.  Using a sterile surgical marker, an appropriate advancement flap was drawn incorporating the defect and placing the expected incisions within the relaxed skin tension lines where possible. The area thus outlined was incised deep to adipose tissue with a #15 scalpel blade. The skin margins were undermined to an appropriate distance in all directions utilizing iris scissors and carried over to close the primary defect.
Rhomboid Transposition Flap Text: The defect edges were debeveled with a #15 scalpel blade. Given the location of the defect and the proximity to free margins a rhomboid transposition flap was deemed most appropriate. Using a sterile surgical marker, an appropriate rhomboid flap was drawn incorporating the defect. The area thus outlined was incised deep to adipose tissue with a #15 scalpel blade. The skin margins were undermined to an appropriate distance in all directions utilizing iris scissors. Following this, the designed flap was carried over into the primary defect and sutured into place.
Mercedes Flap Text: The defect edges were debeveled with a #15 scalpel blade. Given the location of the defect, shape of the defect and the proximity to free margins a Mercedes flap was deemed most appropriate. Using a sterile surgical marker, an appropriate advancement flap was drawn incorporating the defect and placing the expected incisions within the relaxed skin tension lines where possible. The area thus outlined was incised deep to adipose tissue with a #15 scalpel blade. The skin margins were undermined to an appropriate distance in all directions utilizing iris scissors. Following this, the designed flap was advanced and carried over into the primary defect and sutured into place.
Lab: -[id number]
Suturegard Body: The suture ends were repeatedly re-tightened and re-clamped to achieve the desired tissue expansion.
Complex Repair And Xenograft Text: The defect edges were debeveled with a #15 scalpel blade.  The primary defect was closed partially with a complex linear closure.  Given the location of the defect, shape of the defect and the proximity to free margins a xenograft was deemed most appropriate to repair the remaining defect.  The graft was trimmed to fit the size of the remaining defect.  The graft was then placed in the primary defect, oriented appropriately, and sutured into place.
Information: Selecting Yes will display possible errors in your note based on the variables you have selected. This validation is only offered as a suggestion for you. PLEASE NOTE THAT THE VALIDATION TEXT WILL BE REMOVED WHEN YOU FINALIZE YOUR NOTE. IF YOU WANT TO FAX A PRELIMINARY NOTE YOU WILL NEED TO TOGGLE THIS TO 'NO' IF YOU DO NOT WANT IT IN YOUR FAXED NOTE.
Ftsg Text: The defect edges were debeveled with a #15 scalpel blade. Given the location of the defect, shape of the defect and the proximity to free margins a full thickness skin graft was deemed most appropriate. Using a sterile surgical marker, the primary defect shape was transferred to the donor site. The area thus outlined was incised deep to adipose tissue with a #15 scalpel blade.  The harvested graft was then trimmed of adipose tissue until only dermis and epidermis was left.  The skin margins of the secondary defect were undermined to an appropriate distance in all directions utilizing iris scissors.  The secondary defect was closed with interrupted buried subcutaneous sutures.  The skin edges were then re-apposed with running  sutures.  The skin graft was then placed in the primary defect and oriented appropriately.
Excisional Biopsy Additional Text (Leave Blank If You Do Not Want): The margin was drawn around the clinically apparent lesion. An elliptical shape was then drawn on the skin incorporating the lesion and margins.  Incisions were then made along these lines to the appropriate tissue plane and the lesion was extirpated.
W Plasty Text: The lesion was extirpated to the level of the fat with a #15 scalpel blade. Given the location of the defect, shape of the defect and the proximity to free margins a W-plasty was deemed most appropriate for repair. Using a sterile surgical marker, the appropriate transposition arms of the W-plasty were drawn incorporating the defect and placing the expected incisions within the relaxed skin tension lines where possible. The area thus outlined was incised deep to adipose tissue with a #15 scalpel blade. The skin margins were undermined to an appropriate distance in all directions utilizing iris scissors. The opposing transposition arms were then transposed and carried over into place in opposite direction and anchored with interrupted buried subcutaneous sutures.
Epidermal Sutures: 6-0 Polypropylene
Helical Rim Advancement Flap Text: The defect edges were debeveled with a #15 blade scalpel.  Given the location of the defect and the proximity to free margins (helical rim) a double helical rim advancement flap was deemed most appropriate. Using a sterile surgical marker, the appropriate advancement flaps were drawn incorporating the defect and placing the expected incisions between the helical rim and antihelix where possible.  The area thus outlined was incised through and through with a #15 scalpel blade.  With a skin hook and iris scissors, the flaps were gently and sharply undermined and freed up. Folllowing this, the designed flaps were carried over into the primary defect and sutured into place.
Complex Repair And Bilobe Flap Text: The defect edges were debeveled with a #15 scalpel blade.  The primary defect was closed partially with a complex linear closure.  Given the location of the remaining defect, shape of the defect and the proximity to free margins a bilobe flap was deemed most appropriate for complete closure of the defect.  Using a sterile surgical marker, an appropriate advancement flap was drawn incorporating the defect and placing the expected incisions within the relaxed skin tension lines where possible. The area thus outlined was incised deep to adipose tissue with a #15 scalpel blade. The skin margins were undermined to an appropriate distance in all directions utilizing iris scissors and carried over to close the primary defect.
Debridement Text: The wound edges were debrided prior to proceeding with the closure to facilitate wound healing.
Billing Type: Third-Party Bill
Body Location Override (Optional - Billing Will Still Be Based On Selected Body Map Location If Applicable): Left Anterior Mandibular Cheek
Ear Star Wedge Flap Text: The defect edges were debeveled with a #15 blade scalpel.  Given the location of the defect and the proximity to free margins (helical rim) an ear star wedge flap was deemed most appropriate. Using a sterile surgical marker, the appropriate flap was drawn incorporating the defect and placing the expected incisions between the helical rim and antihelix where possible.  The area thus outlined was incised through and through with a #15 scalpel blade. Following this, the designed flap was carried over into the primary defect and sutured into place.
Pinch Graft Text: The defect edges were debeveled with a #15 scalpel blade. Given the location of the defect, shape of the defect and the proximity to free margins a pinch graft was deemed most appropriate. Using a sterile surgical marker, the primary defect shape was transferred to the donor site. The area thus outlined was incised deep to adipose tissue with a #15 scalpel blade.  The harvested graft was then trimmed of adipose tissue until only dermis and epidermis was left. The skin margins of the secondary defect were undermined to an appropriate distance in all directions utilizing iris scissors.  The secondary defect was closed with interrupted buried subcutaneous sutures.  The skin edges were then re-apposed with running  sutures.  The skin graft was then placed in the primary defect and oriented appropriately.
Mucosal Advancement Flap Text: Given the location of the defect, shape of the defect and the proximity to free margins a mucosal advancement flap was deemed most appropriate. Incisions were made with a 15 blade scalpel in the appropriate fashion along the cutaneous vermillion border and the mucosal lip. The remaining actinically damaged mucosal tissue was excised.  The mucosal advancement flap was then elevated to the gingival sulcus with care taken to preserve the neurovascular structures and advanced into the primary defect. Care was taken to ensure that precise realignment of the vermilion border was achieved.
Detail Level: Detailed
Path Notes (To The Dermatopathologist): Please check margins
Hemigard Postcare Instructions: The HEMIGARD strips are to remain completely dry for at least 5-7 days.
Excision Depth: adipose tissue
Rotation Flap Text: The defect edges were debeveled with a #15 scalpel blade. Given the location of the defect, shape of the defect and the proximity to free margins a rotation flap was deemed most appropriate. Using a sterile surgical marker, an appropriate rotation flap was drawn incorporating the defect and placing the expected incisions within the relaxed skin tension lines where possible. The area thus outlined was incised deep to adipose tissue with a #15 scalpel blade. The skin margins were undermined to an appropriate distance in all directions utilizing iris scissors. Following this, the designed flap was carried over into the primary defect and sutured into place.
Complex Repair And Rotation Flap Text: The defect edges were debeveled with a #15 scalpel blade.  The primary defect was closed partially with a complex linear closure.  Given the location of the remaining defect, shape of the defect and the proximity to free margins a rotation flap was deemed most appropriate for complete closure of the defect.  Using a sterile surgical marker, an appropriate advancement flap was drawn incorporating the defect and placing the expected incisions within the relaxed skin tension lines where possible. The area thus outlined was incised deep to adipose tissue with a #15 scalpel blade. The skin margins were undermined to an appropriate distance in all directions utilizing iris scissors and carried over to close the primary defect.
Bilateral Rotation Flap Text: The defect edges were debeveled with a #15 scalpel blade. Given the location of the defect, shape of the defect and the proximity to free margins a bilateral rotation flap was deemed most appropriate. Using a sterile surgical marker, an appropriate rotation flap was drawn incorporating the defect and placing the expected incisions within the relaxed skin tension lines where possible. The area thus outlined was incised deep to adipose tissue with a #15 scalpel blade. The skin margins were undermined to an appropriate distance in all directions utilizing iris scissors. Following this, the designed flap was carried over into the primary defect and sutured into place.
Composite Graft Text: The defect edges were debeveled with a #15 scalpel blade. Given the location of the defect, shape of the defect, the proximity to free margins and the fact the defect was full thickness a composite graft was deemed most appropriate.  The defect was outline and then transferred to the donor site.  A full thickness graft was then excised from the donor site. The graft was then placed in the primary defect, oriented appropriately and then sutured into place.  The secondary defect was then repaired using a primary closure.
Repair Performed By Another Provider Text (Leave Blank If You Do Not Want): After the tissue was excised the defect was repaired by another provider.
Repair Type: Intermediate
Bilobed Transposition Flap Text: The defect edges were debeveled with a #15 scalpel blade. Given the location of the defect and the proximity to free margins a bilobed transposition flap was deemed most appropriate. Using a sterile surgical marker, an appropriate bilobe flap drawn around the defect. The area thus outlined was incised deep to adipose tissue with a #15 scalpel blade. The skin margins were undermined to an appropriate distance in all directions utilizing iris scissors. Following this, the designed flap was carried over into the primary defect and sutured into place.
Adjacent Tissue Transfer Text: The defect edges were debeveled with a #15 scalpel blade. Given the location of the defect and the proximity to free margins an adjacent tissue transfer was deemed most appropriate. Using a sterile surgical marker, an appropriate flap was drawn incorporating the defect and placing the expected incisions within the relaxed skin tension lines where possible. The area thus outlined was incised deep to adipose tissue with a #15 scalpel blade. The skin margins were undermined to an appropriate distance in all directions utilizing iris scissors and carried over to close the primary defect.
Nostril Rim Text: The closure involved the nostril rim.
Complex Repair Preamble Text (Leave Blank If You Do Not Want): Extensive wide undermining was performed.
Complex Repair And V-Y Plasty Text: The defect edges were debeveled with a #15 scalpel blade.  The primary defect was closed partially with a complex linear closure.  Given the location of the remaining defect, shape of the defect and the proximity to free margins a V-Y plasty was deemed most appropriate for complete closure of the defect.  Using a sterile surgical marker, an appropriate advancement flap was drawn incorporating the defect and placing the expected incisions within the relaxed skin tension lines where possible. The area thus outlined was incised deep to adipose tissue with a #15 scalpel blade. The skin margins were undermined to an appropriate distance in all directions utilizing iris scissors and carried over to close the primary defect.
Medical Necessity Clause: This procedure was medically necessary because the lesion that was treated was:
Staged Advancement Flap Text: The defect edges were debeveled with a #15 scalpel blade. Given the location of the defect, shape of the defect and the proximity to free margins a staged advancement flap was deemed most appropriate. Using a sterile surgical marker, an appropriate advancement flap was drawn incorporating the defect and placing the expected incisions within the relaxed skin tension lines where possible. The area thus outlined was incised deep to adipose tissue with a #15 scalpel blade. The skin margins were undermined to an appropriate distance in all directions utilizing iris scissors. Following this, the designed flap was carried over into the primary defect and sutured into place.
Dermal Autograft Text: The defect edges were debeveled with a #15 scalpel blade. Given the location of the defect, shape of the defect and the proximity to free margins a dermal autograft was deemed most appropriate. Using a sterile surgical marker, the primary defect shape was transferred to the donor site. The area thus outlined was incised deep to adipose tissue with a #15 scalpel blade.  The harvested graft was then trimmed of adipose and epidermal tissue until only dermis was left.  The skin graft was then placed in the primary defect and oriented appropriately.
Intermediate / Complex Repair - Final Wound Length In Cm: 2.6
Double Z Plasty Text: The lesion was extirpated to the level of the fat with a #15 scalpel blade. Given the location of the defect, shape of the defect and the proximity to free margins a double Z-plasty was deemed most appropriate for repair. Using a sterile surgical marker, the appropriate transposition arms of the double Z-plasty were drawn incorporating the defect and placing the expected incisions within the relaxed skin tension lines where possible. The area thus outlined was incised deep to adipose tissue with a #15 scalpel blade. The skin margins were undermined to an appropriate distance in all directions utilizing iris scissors. The opposing transposition arms were then transposed and carried over into place in opposite direction and anchored with interrupted buried subcutaneous sutures.
Advancement Flap (Double) Text: The defect edges were debeveled with a #15 scalpel blade. Given the location of the defect and the proximity to free margins a double advancement flap was deemed most appropriate. Using a sterile surgical marker, the appropriate advancement flaps were drawn incorporating the defect and placing the expected incisions within the relaxed skin tension lines where possible. The area thus outlined was incised deep to adipose tissue with a #15 scalpel blade. The skin margins were undermined to an appropriate distance in all directions utilizing iris scissors. Following this, the designed flaps were advanced and carried over into the primary defect and sutured into place.
Complex Repair And Double M Plasty Text: The defect edges were debeveled with a #15 scalpel blade.  The primary defect was closed partially with a complex linear closure.  Given the location of the remaining defect, shape of the defect and the proximity to free margins a double M plasty was deemed most appropriate for complete closure of the defect.  Using a sterile surgical marker, an appropriate advancement flap was drawn incorporating the defect and placing the expected incisions within the relaxed skin tension lines where possible. The area thus outlined was incised deep to adipose tissue with a #15 scalpel blade. The skin margins were undermined to an appropriate distance in all directions utilizing iris scissors and carried over to close the primary defect.
Dorsal Nasal Flap Text: The defect edges were debeveled with a #15 scalpel blade. Given the location of the defect and the proximity to free margins a dorsal nasal flap was deemed most appropriate. Using a sterile surgical marker, an appropriate dorsal nasal flap was drawn around the defect. The area thus outlined was incised deep to adipose tissue with a #15 scalpel blade. The skin margins were undermined to an appropriate distance in all directions utilizing iris scissors. Following this, the designed flap was carried into the primary defect and sutured into place.
Post-Care Instructions: I reviewed with the patient in detail post-care instructions. Patient is not to engage in any heavy lifting, exercise, or swimming for the next 14 days. Should the patient develop any fevers, chills, bleeding, severe pain patient will contact the office immediately.
Epidermal Closure Graft Donor Site (Optional): simple interrupted
Island Pedicle Flap With Canthal Suspension Text: The defect edges were debeveled with a #15 scalpel blade. Given the location of the defect, shape of the defect and the proximity to free margins an island pedicle advancement flap was deemed most appropriate. Using a sterile surgical marker, an appropriate advancement flap was drawn incorporating the defect, outlining the appropriate donor tissue and placing the expected incisions within the relaxed skin tension lines where possible. The area thus outlined was incised deep to adipose tissue with a #15 scalpel blade. The skin margins were undermined to an appropriate distance in all directions around the primary defect and laterally outward around the island pedicle utilizing iris scissors.  There was minimal undermining beneath the pedicle flap. A suspension suture was placed in the canthal tendon to prevent tension and prevent ectropion. Following this, the designed flap was placed into the primary defect and sutured into place.
Tissue Cultured Epidermal Autograft Text: The defect edges were debeveled with a #15 scalpel blade. Given the location of the defect, shape of the defect and the proximity to free margins a tissue cultured epidermal autograft was deemed most appropriate.  The graft was then trimmed to fit the size of the defect.  The graft was then placed in the primary defect and oriented appropriately.
Undermining Type: Entire Wound
Transposition Flap Text: The defect edges were debeveled with a #15 scalpel blade. Given the location of the defect and the proximity to free margins a transposition flap was deemed most appropriate. Using a sterile surgical marker, an appropriate transposition flap was drawn incorporating the defect. The area thus outlined was incised deep to adipose tissue with a #15 scalpel blade. The skin margins were undermined to an appropriate distance in all directions utilizing iris scissors. Following this, the designed flap was carried over into the primary defect and sutured into place.
Chonodrocutaneous Helical Advancement Flap Text: The defect edges were debeveled with a #15 scalpel blade. Given the location of the defect and the proximity to free margins a chondrocutaneous helical advancement flap was deemed most appropriate. Using a sterile surgical marker, the appropriate advancement flap was drawn incorporating the defect and placing the expected incisions within the relaxed skin tension lines where possible. The area thus outlined was incised deep to adipose tissue with a #15 scalpel blade. The skin margins were undermined to an appropriate distance in all directions utilizing iris scissors. Following this, the designed flap was advanced and carried over into the primary defect and sutured into place.
Where Do You Want The Question To Include Opioid Counseling Located?: Case Summary Tab
Purse String (Intermediate) Text: Given the location of the defect and the characteristics of the surrounding skin a purse string intermediate closure was deemed most appropriate.  Undermining was performed circumferentially around the surgical defect.  A purse string suture was then placed and tightened.
Complex Repair And Z Plasty Text: The defect edges were debeveled with a #15 scalpel blade.  The primary defect was closed partially with a complex linear closure.  Given the location of the remaining defect, shape of the defect and the proximity to free margins a Z plasty was deemed most appropriate for complete closure of the defect.  Using a sterile surgical marker, an appropriate advancement flap was drawn incorporating the defect and placing the expected incisions within the relaxed skin tension lines where possible. The area thus outlined was incised deep to adipose tissue with a #15 scalpel blade. The skin margins were undermined to an appropriate distance in all directions utilizing iris scissors and carried over to close the primary defect.
Cheek Interpolation Flap Text: A decision was made to reconstruct the defect utilizing an interpolation axial flap and a staged reconstruction.  A telfa template was made of the defect.  This telfa template was then used to outline the Cheek Interpolation flap.  The donor area for the pedicle flap was then injected with anesthesia.  The flap was excised through the skin and subcutaneous tissue down to the layer of the underlying musculature.  The interpolation flap was carefully excised within this deep plane to maintain its blood supply.  The edges of the donor site were undermined.   The donor site was closed in a primary fashion.  The pedicle was then rotated into position and sutured.  Once the tube was sutured into place, adequate blood supply was confirmed with blanching and refill.  The pedicle was then wrapped with xeroform gauze and dressed appropriately with a telfa and gauze bandage to ensure continued blood supply and protect the attached pedicle.
Suture Removal: 7 days
Deep Sutures: 5-0 PGCL
Double Island Pedicle Flap Text: The defect edges were debeveled with a #15 scalpel blade. Given the location of the defect, shape of the defect and the proximity to free margins a double island pedicle advancement flap was deemed most appropriate. Using a sterile surgical marker, an appropriate advancement flap was drawn incorporating the defect, outlining the appropriate donor tissue and placing the expected incisions within the relaxed skin tension lines where possible. The area thus outlined was incised deep to adipose tissue with a #15 scalpel blade. The skin margins were undermined to an appropriate distance in all directions around the primary defect and laterally outward around the island pedicle utilizing iris scissors.  There was minimal undermining beneath the pedicle flap. Following this, the flap was carried over into the primary defect and sutured into place.
Complex Repair And Ftsg Text: The defect edges were debeveled with a #15 scalpel blade.  The primary defect was closed partially with a complex linear closure.  Given the location of the defect, shape of the defect and the proximity to free margins a full thickness skin graft was deemed most appropriate to repair the remaining defect.  The graft was trimmed to fit the size of the remaining defect.  The graft was then placed in the primary defect, oriented appropriately, and sutured into place.
Mustarde Flap Text: The defect edges were debeveled with a #15 scalpel blade.  Given the size, depth and location of the defect and the proximity to free margins a Mustarde flap was deemed most appropriate. Using a sterile surgical marker, an appropriate flap was drawn incorporating the defect. The area thus outlined was incised with a #15 scalpel blade. The skin margins were undermined to an appropriate distance in all directions utilizing iris scissors. Following this, the designed flap was carried into the primary defect and sutured into place.
Nasalis-Muscle-Based Myocutaneous Island Pedicle Flap Text: Using a #15 blade, an incision was made around the donor flap to the level of the nasalis muscle. Wide lateral undermining was then performed in both the subcutaneous plane above the nasalis muscle, and in a submuscular plane just above periosteum. This allowed the formation of a free nasalis muscle axial pedicle (based on the angular artery) which was still attached to the actual cutaneous flap, increasing its mobility and vascular viability. Hemostasis was obtained with pinpoint electrocoagulation. The flap was mobilized into position and the pivotal anchor points positioned and stabilized with buried interrupted sutures. Subcutaneous and dermal tissues were closed in a multilayered fashion with sutures. Tissue redundancies were excised, and the epidermal edges were apposed without significant tension and sutured with sutures.
Complex Repair And Single Advancement Flap Text: The defect edges were debeveled with a #15 scalpel blade.  The primary defect was closed partially with a complex linear closure.  Given the location of the remaining defect, shape of the defect and the proximity to free margins a single advancement flap was deemed most appropriate for complete closure of the defect.  Using a sterile surgical marker, an appropriate advancement flap was drawn incorporating the defect and placing the expected incisions within the relaxed skin tension lines where possible. The area thus outlined was incised deep to adipose tissue with a #15 scalpel blade. The skin margins were undermined to an appropriate distance in all directions utilizing iris scissors and carried over to close the primary defect.
Interpolation Flap Text: A decision was made to reconstruct the defect utilizing an interpolation axial flap and a staged reconstruction.  A telfa template was made of the defect.  This telfa template was then used to outline the interpolation flap.  The donor area for the pedicle flap was then injected with anesthesia.  The flap was excised through the skin and subcutaneous tissue down to the layer of the underlying musculature.  The interpolation flap was carefully excised within this deep plane to maintain its blood supply.  The edges of the donor site were undermined.   The donor site was closed in a primary fashion.  The pedicle was then rotated into position and sutured.  Once the tube was sutured into place, adequate blood supply was confirmed with blanching and refill.  The pedicle was then wrapped with xeroform gauze and dressed appropriately with a telfa and gauze bandage to ensure continued blood supply and protect the attached pedicle.
A-T Advancement Flap Text: The defect edges were debeveled with a #15 scalpel blade. Given the location of the defect, shape of the defect and the proximity to free margins an A-T advancement flap was deemed most appropriate. Using a sterile surgical marker, an appropriate advancement flap was drawn incorporating the defect and placing the expected incisions within the relaxed skin tension lines where possible. The area thus outlined was incised deep to adipose tissue with a #15 scalpel blade. The skin margins were undermined to an appropriate distance in all directions utilizing iris scissors. Following this, the designed flap was advanced and carried over into the primary defect and sutured into place.
Dressing: pressure dressing with telfa
Complex Repair And Double Advancement Flap Text: The defect edges were debeveled with a #15 scalpel blade.  The primary defect was closed partially with a complex linear closure.  Given the location of the remaining defect, shape of the defect and the proximity to free margins a double advancement flap was deemed most appropriate for complete closure of the defect.  Using a sterile surgical marker, an appropriate advancement flap was drawn incorporating the defect and placing the expected incisions within the relaxed skin tension lines where possible. The area thus outlined was incised deep to adipose tissue with a #15 scalpel blade. The skin margins were undermined to an appropriate distance in all directions utilizing iris scissors and carried over to close the primary defect.
Orbicularis Oris Muscle Flap Text: The defect edges were debeveled with a #15 scalpel blade.  Given that the defect affected the competency of the oral sphincter an orbicularis oris muscle flap was deemed most appropriate to restore this competency and normal muscle function.  Using a sterile surgical marker, an appropriate flap was drawn incorporating the defect. The area thus outlined was incised with a #15 scalpel blade. Following this, the designed flap was carried over into the primary defect and sutured into place.
O-T Advancement Flap Text: The defect edges were debeveled with a #15 scalpel blade. Given the location of the defect, shape of the defect and the proximity to free margins an O-T advancement flap was deemed most appropriate. Using a sterile surgical marker, an appropriate advancement flap was drawn incorporating the defect and placing the expected incisions within the relaxed skin tension lines where possible. The area thus outlined was incised deep to adipose tissue with a #15 scalpel blade. The skin margins were undermined to an appropriate distance in all directions utilizing iris scissors. Following this, the designed flap was advanced and carried over into the primary defect and sutured into place.
Length To Time In Minutes Device Was In Place: 10
O-Z Flap Text: The defect edges were debeveled with a #15 scalpel blade. Given the location of the defect, shape of the defect and the proximity to free margins an O-Z flap was deemed most appropriate. Using a sterile surgical marker, an appropriate transposition flap was drawn incorporating the defect and placing the expected incisions within the relaxed skin tension lines where possible. The area thus outlined was incised deep to adipose tissue with a #15 scalpel blade. The skin margins were undermined to an appropriate distance in all directions utilizing iris scissors. Following this, the designed flap was carried over into the primary defect and sutured into place.
Posterior Auricular Interpolation Flap Text: A decision was made to reconstruct the defect utilizing an interpolation axial flap and a staged reconstruction.  A telfa template was made of the defect.  This telfa template was then used to outline the posterior auricular interpolation flap.  The donor area for the pedicle flap was then injected with anesthesia.  The flap was excised through the skin and subcutaneous tissue down to the layer of the underlying musculature.  The pedicle flap was carefully excised within this deep plane to maintain its blood supply.  The edges of the donor site were undermined.   The donor site was closed in a primary fashion.  The pedicle was then rotated into position and sutured.  Once the tube was sutured into place, adequate blood supply was confirmed with blanching and refill.  The pedicle was then wrapped with xeroform gauze and dressed appropriately with a telfa and gauze bandage to ensure continued blood supply and protect the attached pedicle.
O-T Plasty Text: The defect edges were debeveled with a #15 scalpel blade. Given the location of the defect, shape of the defect and the proximity to free margins an O-T plasty was deemed most appropriate. Using a sterile surgical marker, an appropriate O-T plasty was drawn incorporating the defect and placing the expected incisions within the relaxed skin tension lines where possible. The area thus outlined was incised deep to adipose tissue with a #15 scalpel blade. The skin margins were undermined to an appropriate distance in all directions utilizing iris scissors. Following this, the designed flap was carried over into the primary defect and sutured into place.
Size Of Margin In Cm: 0.2
Complex Repair And Epidermal Autograft Text: The defect edges were debeveled with a #15 scalpel blade.  The primary defect was closed partially with a complex linear closure.  Given the location of the defect, shape of the defect and the proximity to free margins an epidermal autograft was deemed most appropriate to repair the remaining defect.  The graft was trimmed to fit the size of the remaining defect.  The graft was then placed in the primary defect, oriented appropriately, and sutured into place.
Rhombic Flap Text: The defect edges were debeveled with a #15 scalpel blade. Given the location of the defect and the proximity to free margins a rhombic flap was deemed most appropriate. Using a sterile surgical marker, an appropriate rhombic flap was drawn incorporating the defect. The area thus outlined was incised deep to adipose tissue with a #15 scalpel blade. The skin margins were undermined to an appropriate distance in all directions utilizing iris scissors. Following this, the designed flap was carried over into the primary defect and sutured into place.
Eliptical Excision Additional Text (Leave Blank If You Do Not Want): The margin was drawn around the clinically apparent lesion.  An elliptical shape was then drawn on the skin incorporating the lesion and margins.  Incisions were then made along these lines to the appropriate tissue plane and the lesion was extirpated.
Complex Repair And A-T Advancement Flap Text: The defect edges were debeveled with a #15 scalpel blade.  The primary defect was closed partially with a complex linear closure.  Given the location of the remaining defect, shape of the defect and the proximity to free margins an A-T advancement flap was deemed most appropriate for complete closure of the defect.  Using a sterile surgical marker, an appropriate advancement flap was drawn incorporating the defect and placing the expected incisions within the relaxed skin tension lines where possible. The area thus outlined was incised deep to adipose tissue with a #15 scalpel blade. The skin margins were undermined to an appropriate distance in all directions utilizing iris scissors and carried over to close the primary defect.
Lip Wedge Excision Repair Text: Given the location of the defect and the proximity to free margins a full thickness wedge repair was deemed most appropriate. Using a sterile surgical marker, the appropriate repair was drawn incorporating the defect and placing the expected incisions perpendicular to the vermilion border.  The vermilion border was also meticulously outlined to ensure appropriate reapproximation during the repair.  The area thus outlined was incised through and through with a #15 scalpel blade.  The muscularis and dermis were reaproximated with deep sutures following hemostasis. Care was taken to realign the vermilion border before proceeding with the superficial closure.  Once the vermilion was realigned the superfical and mucosal closure was finished.
V-Y Flap Text: The defect edges were debeveled with a #15 scalpel blade. Given the location of the defect, shape of the defect and the proximity to free margins a V-Y flap was deemed most appropriate. Using a sterile surgical marker, an appropriate advancement flap was drawn incorporating the defect and placing the expected incisions within the relaxed skin tension lines where possible. The area thus outlined was incised deep to adipose tissue with a #15 scalpel blade. The skin margins were undermined to an appropriate distance in all directions utilizing iris scissors. Following this, the designed flap was advanced and carried over into the primary defect and sutured into place.
Hemigard Retention Suture: 2-0 Nylon
Suturegard Intro: Intraoperative tissue expansion was performed, utilizing the SUTUREGARD device, in order to reduce wound tension.
Double O-Z Plasty Text: The defect edges were debeveled with a #15 scalpel blade. Given the location of the defect, shape of the defect and the proximity to free margins a Double O-Z plasty (double transposition flap) was deemed most appropriate. Using a sterile surgical marker, the appropriate transposition flaps were drawn incorporating the defect and placing the expected incisions within the relaxed skin tension lines where possible. The area thus outlined was incised deep to adipose tissue with a #15 scalpel blade. The skin margins were undermined to an appropriate distance in all directions utilizing iris scissors. Hemostasis was achieved with electrocautery. The flaps were then transposed and carried over into place, one clockwise and the other counterclockwise, and anchored with interrupted buried subcutaneous sutures.
Excision Method: Elliptical
Complex Repair And Tissue Cultured Epidermal Autograft Text: The defect edges were debeveled with a #15 scalpel blade.  The primary defect was closed partially with a complex linear closure.  Given the location of the defect, shape of the defect and the proximity to free margins an tissue cultured epidermal autograft was deemed most appropriate to repair the remaining defect.  The graft was trimmed to fit the size of the remaining defect.  The graft was then placed in the primary defect, oriented appropriately, and sutured into place.
Slit Excision Additional Text (Leave Blank If You Do Not Want): A linear line was drawn on the skin overlying the lesion. An incision was made slowly until the lesion was visualized.  Once visualized, the lesion was removed with blunt dissection.
H Plasty Text: Given the location of the defect, shape of the defect and the proximity to free margins a H-plasty was deemed most appropriate for repair. Using a sterile surgical marker, the appropriate advancement arms of the H-plasty were drawn incorporating the defect and placing the expected incisions within the relaxed skin tension lines where possible. The area thus outlined was incised deep to adipose tissue with a #15 scalpel blade. The skin margins were undermined to an appropriate distance in all directions utilizing iris scissors.  The opposing advancement arms were then advanced and carried over into place in opposite direction and anchored with interrupted buried subcutaneous sutures.
Saucerization Depth: dermis and superficial adipose tissue
Complex Repair And O-L Flap Text: The defect edges were debeveled with a #15 scalpel blade.  The primary defect was closed partially with a complex linear closure.  Given the location of the remaining defect, shape of the defect and the proximity to free margins an O-L flap was deemed most appropriate for complete closure of the defect.  Using a sterile surgical marker, an appropriate flap was drawn incorporating the defect and placing the expected incisions within the relaxed skin tension lines where possible. The area thus outlined was incised deep to adipose tissue with a #15 scalpel blade. The skin margins were undermined to an appropriate distance in all directions utilizing iris scissors and carried over to close the primary defect.
Bi-Rhombic Flap Text: The defect edges were debeveled with a #15 scalpel blade. Given the location of the defect and the proximity to free margins a bi-rhombic flap was deemed most appropriate. Using a sterile surgical marker, an appropriate rhombic flap was drawn incorporating the defect. The area thus outlined was incised deep to adipose tissue with a #15 scalpel blade. The skin margins were undermined to an appropriate distance in all directions utilizing iris scissors. Following this, the designed flap was carried over into the primary defect and sutured into place.
Number Of Hemigard Strips Per Side: 1
Hemigard Intro: Due to skin fragility and wound tension, it was decided to use HEMIGARD adhesive retention suture devices to permit a linear closure. The skin was cleaned and dried for a 6cm distance away from the wound. Excessive hair, if present, was removed to allow for adhesion.
Bilateral Helical Rim Advancement Flap Text: The defect edges were debeveled with a #15 blade scalpel.  Given the location of the defect and the proximity to free margins (helical rim) a bilateral helical rim advancement flap was deemed most appropriate. Using a sterile surgical marker, the appropriate advancement flaps were drawn incorporating the defect and placing the expected incisions between the helical rim and antihelix where possible.  The area thus outlined was incised through and through with a #15 scalpel blade.  With a skin hook and iris scissors, the flaps were gently and sharply undermined and freed up. Following this, the designed flaps were placed into the primary defect and sutured into place.
Complex Repair And Skin Substitute Graft Text: The defect edges were debeveled with a #15 scalpel blade.  The primary defect was closed partially with a complex linear closure.  Given the location of the remaining defect, shape of the defect and the proximity to free margins a skin substitute graft was deemed most appropriate to repair the remaining defect.  The graft was trimmed to fit the size of the remaining defect.  The graft was then placed in the primary defect, oriented appropriately, and sutured into place.
Split-Thickness Skin Graft Text: The defect edges were debeveled with a #15 scalpel blade. Given the location of the defect, shape of the defect and the proximity to free margins a split thickness skin graft was deemed most appropriate. Using a sterile surgical marker, the primary defect shape was transferred to the donor site. The split thickness graft was then harvested.  The skin graft was then placed in the primary defect and oriented appropriately.
Modified Advancement Flap Text: The defect edges were debeveled with a #15 scalpel blade. Given the location of the defect, shape of the defect and the proximity to free margins a modified advancement flap was deemed most appropriate. Using a sterile surgical marker, an appropriate advancement flap was drawn incorporating the defect and placing the expected incisions within the relaxed skin tension lines where possible. The area thus outlined was incised deep to adipose tissue with a #15 scalpel blade. The skin margins were undermined to an appropriate distance in all directions utilizing iris scissors. Following this, the designed flap was advanced and carried over into the primary defect and sutured into place.
Perilesional Excision Additional Text (Leave Blank If You Do Not Want): The margin was drawn around the clinically apparent lesion. Incisions were then made along these lines to the appropriate tissue plane and the lesion was extirpated.
Burow's Graft Text: The defect edges were debeveled with a #15 scalpel blade. Given the location of the defect, shape of the defect, the proximity to free margins and the presence of a standing cone deformity a Burow's skin graft was deemed most appropriate. The standing cone was removed and this tissue was then trimmed to the shape of the primary defect. The adipose tissue was also removed until only dermis and epidermis were left.  The skin margins of the secondary defect were undermined to an appropriate distance in all directions utilizing iris scissors.  The secondary defect was closed with interrupted buried subcutaneous sutures.  The skin edges were then re-apposed with running  sutures.  The skin graft was then placed in the primary defect and oriented appropriately.
Complex Repair And Melolabial Flap Text: The defect edges were debeveled with a #15 scalpel blade.  The primary defect was closed partially with a complex linear closure.  Given the location of the remaining defect, shape of the defect and the proximity to free margins a melolabial flap was deemed most appropriate for complete closure of the defect.  Using a sterile surgical marker, an appropriate advancement flap was drawn incorporating the defect and placing the expected incisions within the relaxed skin tension lines where possible. The area thus outlined was incised deep to adipose tissue with a #15 scalpel blade. The skin margins were undermined to an appropriate distance in all directions utilizing iris scissors and carried over to close the primary defect.
Graft Donor Site Bandage (Optional-Leave Blank If You Don't Want In Note): Steri-strips and a pressure bandage were applied to the donor site.
Banner Transposition Flap Text: The defect edges were debeveled with a #15 scalpel blade. Given the location of the defect and the proximity to free margins a Banner transposition flap was deemed most appropriate. Using a sterile surgical marker, an appropriate flap was drawn around the defect. The area thus outlined was incised deep to adipose tissue with a #15 scalpel blade. The skin margins were undermined to an appropriate distance in all directions utilizing iris scissors. Following this, the designed flap was carried into the primary defect and sutured into place.
Helical Rim Text: The closure involved the helical rim.
Complex Repair And Rhombic Flap Text: The defect edges were debeveled with a #15 scalpel blade.  The primary defect was closed partially with a complex linear closure.  Given the location of the remaining defect, shape of the defect and the proximity to free margins a rhombic flap was deemed most appropriate for complete closure of the defect.  Using a sterile surgical marker, an appropriate advancement flap was drawn incorporating the defect and placing the expected incisions within the relaxed skin tension lines where possible. The area thus outlined was incised deep to adipose tissue with a #15 scalpel blade. The skin margins were undermined to an appropriate distance in all directions utilizing iris scissors and carried over to close the primary defect.
Hatchet Flap Text: The defect edges were debeveled with a #15 scalpel blade. Given the location of the defect, shape of the defect and the proximity to free margins a hatchet flap was deemed most appropriate. Using a sterile surgical marker, an appropriate hatchet flap was drawn incorporating the defect and placing the expected incisions within the relaxed skin tension lines where possible. The area thus outlined was incised deep to adipose tissue with a #15 scalpel blade. The skin margins were undermined to an appropriate distance in all directions utilizing iris scissors. Following this, the designed flap was carried over into the primary defect and sutured into place.
Vermilion Border Text: The closure involved the vermilion border.
Complex Repair And Transposition Flap Text: The defect edges were debeveled with a #15 scalpel blade.  The primary defect was closed partially with a complex linear closure.  Given the location of the remaining defect, shape of the defect and the proximity to free margins a transposition flap was deemed most appropriate for complete closure of the defect.  Using a sterile surgical marker, an appropriate advancement flap was drawn incorporating the defect and placing the expected incisions within the relaxed skin tension lines where possible. The area thus outlined was incised deep to adipose tissue with a #15 scalpel blade. The skin margins were undermined to an appropriate distance in all directions utilizing iris scissors and carried over to close the primary defect.
Bilobed Flap Text: The defect edges were debeveled with a #15 scalpel blade. Given the location of the defect and the proximity to free margins a bilobe flap was deemed most appropriate. Using a sterile surgical marker, an appropriate bilobe flap drawn around the defect. The area thus outlined was incised deep to adipose tissue with a #15 scalpel blade. The skin margins were undermined to an appropriate distance in all directions utilizing iris scissors. Following this, the designed flap was carried over into the primary defect and sutured into place.
Cartilage Graft Text: The defect edges were debeveled with a #15 scalpel blade. Given the location of the defect, shape of the defect, the fact the defect involved a full thickness cartilage defect a cartilage graft was deemed most appropriate.  An appropriate donor site was identified, cleansed, and anesthetized. The cartilage graft was then harvested and transferred to the recipient site, oriented appropriately and then sutured into place.  The secondary defect was then repaired using a primary closure.
Epidermal Autograft Text: The defect edges were debeveled with a #15 scalpel blade. Given the location of the defect, shape of the defect and the proximity to free margins an epidermal autograft was deemed most appropriate. Using a sterile surgical marker, the primary defect shape was transferred to the donor site. The epidermal graft was then harvested.  The skin graft was then placed in the primary defect and oriented appropriately.
Spiral Flap Text: The defect edges were debeveled with a #15 scalpel blade. Given the location of the defect, shape of the defect and the proximity to free margins a spiral flap was deemed most appropriate. Using a sterile surgical marker, an appropriate rotation flap was drawn incorporating the defect and placing the expected incisions within the relaxed skin tension lines where possible. The area thus outlined was incised deep to adipose tissue with a #15 scalpel blade. The skin margins were undermined to an appropriate distance in all directions utilizing iris scissors. Following this, the designed flap was carried over into the primary defect and sutured into place.
No Repair - Repaired With Adjacent Surgical Defect Text (Leave Blank If You Do Not Want): After the excision the defect was repaired concurrently with another surgical defect which was in close approximation.
Scalpel Size: 15 blade
Advancement Flap (Single) Text: The defect edges were debeveled with a #15 scalpel blade. Given the location of the defect and the proximity to free margins a single advancement flap was deemed most appropriate. Using a sterile surgical marker, an appropriate advancement flap was drawn incorporating the defect and placing the expected incisions within the relaxed skin tension lines where possible. The area thus outlined was incised deep to adipose tissue with a #15 scalpel blade. The skin margins were undermined to an appropriate distance in all directions utilizing iris scissors. Following this, the designed flap was advanced and carried over into the primary defect and sutured into place.
Z Plasty Text: The lesion was extirpated to the level of the fat with a #15 scalpel blade. Given the location of the defect, shape of the defect and the proximity to free margins a Z-plasty was deemed most appropriate for repair. Using a sterile surgical marker, the appropriate transposition arms of the Z-plasty were drawn incorporating the defect and placing the expected incisions within the relaxed skin tension lines where possible. The area thus outlined was incised deep to adipose tissue with a #15 scalpel blade. The skin margins were undermined to an appropriate distance in all directions utilizing iris scissors. The opposing transposition arms were then transposed and carried over into place in opposite direction and anchored with interrupted buried subcutaneous sutures.
Consent was obtained from the patient. The risks and benefits to therapy were discussed in detail. Specifically, the risks of infection, scarring, bleeding, prolonged wound healing, incomplete removal, allergy to anesthesia, nerve injury and recurrence were addressed. Prior to the procedure, the treatment site was clearly identified and confirmed by the patient. All components of Universal Protocol/PAUSE Rule completed.
Anesthesia Type: 1% lidocaine with epinephrine
Complex Repair And M Plasty Text: The defect edges were debeveled with a #15 scalpel blade.  The primary defect was closed partially with a complex linear closure.  Given the location of the remaining defect, shape of the defect and the proximity to free margins an M plasty was deemed most appropriate for complete closure of the defect.  Using a sterile surgical marker, an appropriate advancement flap was drawn incorporating the defect and placing the expected incisions within the relaxed skin tension lines where possible. The area thus outlined was incised deep to adipose tissue with a #15 scalpel blade. The skin margins were undermined to an appropriate distance in all directions utilizing iris scissors and carried over to close the primary defect.
Trilobed Flap Text: The defect edges were debeveled with a #15 scalpel blade. Given the location of the defect and the proximity to free margins a trilobed flap was deemed most appropriate. Using a sterile surgical marker, an appropriate trilobed flap was drawn around the defect. The area thus outlined was incised deep to adipose tissue with a #15 scalpel blade. The skin margins were undermined to an appropriate distance in all directions utilizing iris scissors. Following this, the designed flap was carried into the primary defect and sutured into place.
Anesthesia Type: 1% lidocaine with epinephrine and a 1:10 solution of 8.4% sodium bicarbonate
Star Wedge Flap Text: The defect edges were debeveled with a #15 scalpel blade. Given the location of the defect, shape of the defect and the proximity to free margins a star wedge flap was deemed most appropriate. Using a sterile surgical marker, an appropriate rotation flap was drawn incorporating the defect and placing the expected incisions within the relaxed skin tension lines where possible. The area thus outlined was incised deep to adipose tissue with a #15 scalpel blade. The skin margins were undermined to an appropriate distance in all directions utilizing iris scissors. Following this, the designed flap was carried over into the primary defect and sutured into place.
Epidermal Closure: running
Skin Substitute Text: The defect edges were debeveled with a #15 scalpel blade. Given the location of the defect, shape of the defect and the proximity to free margins a skin substitute graft was deemed most appropriate.  The graft material was trimmed to fit the size of the defect. The graft was then placed in the primary defect and oriented appropriately.
Zygomaticofacial Flap Text: Given the location of the defect, shape of the defect and the proximity to free margins a zygomaticofacial flap was deemed most appropriate for repair. Using a sterile surgical marker, the appropriate flap was drawn incorporating the defect and placing the expected incisions within the relaxed skin tension lines where possible. The area thus outlined was incised deep to adipose tissue with a #15 scalpel blade with preservation of a vascular pedicle.  The skin margins were undermined to an appropriate distance in all directions utilizing iris scissors. The flap was then carried over into the defect and anchored with interrupted buried subcutaneous sutures.
Burow's Advancement Flap Text: The defect edges were debeveled with a #15 scalpel blade. Given the location of the defect and the proximity to free margins a Burow's advancement flap was deemed most appropriate. Using a sterile surgical marker, the appropriate advancement flap was drawn incorporating the defect and placing the expected incisions within the relaxed skin tension lines where possible. The area thus outlined was incised deep to adipose tissue with a #15 scalpel blade. The skin margins were undermined to an appropriate distance in all directions utilizing iris scissors. Following this, the designed flap was advanced and carried over into the primary defect and sutured into place.
Surgeon (Optional): Dr. Marbella Ernandez MD
Island Pedicle Flap Text: The defect edges were debeveled with a #15 scalpel blade. Given the location of the defect, shape of the defect and the proximity to free margins an island pedicle advancement flap was deemed most appropriate. Using a sterile surgical marker, an appropriate advancement flap was drawn incorporating the defect, outlining the appropriate donor tissue and placing the expected incisions within the relaxed skin tension lines where possible. The area thus outlined was incised deep to adipose tissue with a #15 scalpel blade. The skin margins were undermined to an appropriate distance in all directions around the primary defect and laterally outward around the island pedicle utilizing iris scissors.  There was minimal undermining beneath the pedicle flap. Following this, the flap was carried over into the primary defect and sutured into place.
Medical Necessity Information: It is in your best interest to select a reason for this procedure from the list below. All of these items fulfill various CMS LCD requirements except lesion extends to a margin.
Retention Suture Text: Retention sutures were placed to support the closure and prevent dehiscence.
Complex Repair And W Plasty Text: The defect edges were debeveled with a #15 scalpel blade.  The primary defect was closed partially with a complex linear closure.  Given the location of the remaining defect, shape of the defect and the proximity to free margins a W plasty was deemed most appropriate for complete closure of the defect.  Using a sterile surgical marker, an appropriate advancement flap was drawn incorporating the defect and placing the expected incisions within the relaxed skin tension lines where possible. The area thus outlined was incised deep to adipose tissue with a #15 scalpel blade. The skin margins were undermined to an appropriate distance in all directions utilizing iris scissors and carried over to close the primary defect.
Alar Island Pedicle Flap Text: The defect edges were debeveled with a #15 scalpel blade. Given the location of the defect, shape of the defect and the proximity to the alar rim an island pedicle advancement flap was deemed most appropriate. Using a sterile surgical marker, an appropriate advancement flap was drawn incorporating the defect, outlining the appropriate donor tissue and placing the expected incisions within the nasal ala running parallel to the alar rim. The area thus outlined was incised with a #15 scalpel blade. The skin margins were undermined minimally to an appropriate distance in all directions around the primary defect and laterally outward around the island pedicle utilizing iris scissors.  There was minimal undermining beneath the pedicle flap. Following this, the designed flap was carried over into the primary defect and sutured into place.
Home Suture Removal Text: Patient was provided a home suture removal kit and will remove their sutures at home.  If they have any questions or difficulties they will call the office.
Xenograft Text: The defect edges were debeveled with a #15 scalpel blade. Given the location of the defect, shape of the defect and the proximity to free margins a xenograft was deemed most appropriate.  The graft was then trimmed to fit the size of the defect.  The graft was then placed in the primary defect and oriented appropriately.
Muscle Hinge Flap Text: The defect edges were debeveled with a #15 scalpel blade.  Given the size, depth and location of the defect and the proximity to free margins a muscle hinge flap was deemed most appropriate. Using a sterile surgical marker, an appropriate hinge flap was drawn incorporating the defect. The area thus outlined was incised with a #15 scalpel blade. The skin margins were undermined to an appropriate distance in all directions utilizing iris scissors. Following this, the designed flap was carried into the primary defect and sutured into place.
Retention Suture Bite Size: 3 mm
Wound Care: Petrolatum
Nasal Turnover Hinge Flap Text: The defect edges were debeveled with a #15 scalpel blade.  Given the size, depth, location of the defect and the defect being full thickness a nasal turnover hinge flap was deemed most appropriate. Using a sterile surgical marker, an appropriate hinge flap was drawn incorporating the defect. The area thus outlined was incised with a #15 scalpel blade. The flap was designed to recreate the nasal mucosal lining and the alar rim. The skin margins were undermined to an appropriate distance in all directions utilizing iris scissors. Following this, the designed flap was carried over into the primary defect and sutured into place
Complex Repair And Dorsal Nasal Flap Text: The defect edges were debeveled with a #15 scalpel blade.  The primary defect was closed partially with a complex linear closure.  Given the location of the remaining defect, shape of the defect and the proximity to free margins a dorsal nasal flap was deemed most appropriate for complete closure of the defect.  Using a sterile surgical marker, an appropriate flap was drawn incorporating the defect and placing the expected incisions within the relaxed skin tension lines where possible. The area thus outlined was incised deep to adipose tissue with a #15 scalpel blade. The skin margins were undermined to an appropriate distance in all directions utilizing iris scissors and carried over to close the primary defect.
Crescentic Advancement Flap Text: The defect edges were debeveled with a #15 scalpel blade. Given the location of the defect and the proximity to free margins a crescentic advancement flap was deemed most appropriate. Using a sterile surgical marker, the appropriate advancement flap was drawn incorporating the defect and placing the expected incisions within the relaxed skin tension lines where possible. The area thus outlined was incised deep to adipose tissue with a #15 scalpel blade. The skin margins were undermined to an appropriate distance in all directions utilizing iris scissors. Following this, the designed flap was advanced and carried over into the primary defect and sutured into place.
Cheek-To-Nose Interpolation Flap Text: A decision was made to reconstruct the defect utilizing an interpolation axial flap and a staged reconstruction.  A telfa template was made of the defect.  This telfa template was then used to outline the Cheek-To-Nose Interpolation flap.  The donor area for the pedicle flap was then injected with anesthesia.  The flap was excised through the skin and subcutaneous tissue down to the layer of the underlying musculature.  The interpolation flap was carefully excised within this deep plane to maintain its blood supply.  The edges of the donor site were undermined.   The donor site was closed in a primary fashion.  The pedicle was then rotated into position and sutured.  Once the tube was sutured into place, adequate blood supply was confirmed with blanching and refill.  The pedicle was then wrapped with xeroform gauze and dressed appropriately with a telfa and gauze bandage to ensure continued blood supply and protect the attached pedicle.
Complex Repair And Burow's Graft Text: The defect edges were debeveled with a #15 scalpel blade.  The primary defect was closed partially with a complex linear closure.  Given the location of the defect, shape of the defect, the proximity to free margins and the presence of a standing cone deformity a Burow's graft was deemed most appropriate to repair the remaining defect.  The graft was trimmed to fit the size of the remaining defect.  The graft was then placed in the primary defect, oriented appropriately, and sutured into place.
Island Pedicle Flap-Requiring Vessel Identification Text: The defect edges were debeveled with a #15 scalpel blade. Given the location of the defect, shape of the defect and the proximity to free margins an island pedicle advancement flap was deemed most appropriate. Using a sterile surgical marker, an appropriate advancement flap was drawn, based on the axial vessel mentioned above, incorporating the defect, outlining the appropriate donor tissue and placing the expected incisions within the relaxed skin tension lines where possible. The area thus outlined was incised deep to adipose tissue with a #15 scalpel blade. The skin margins were undermined to an appropriate distance in all directions around the primary defect and laterally outward around the island pedicle utilizing iris scissors.  There was minimal undermining beneath the pedicle flap. Following this, the designed flap was carried over into the primary defect and sutured into place.
Melolabial Interpolation Flap Text: A decision was made to reconstruct the defect utilizing an interpolation axial flap and a staged reconstruction.  A telfa template was made of the defect.  This telfa template was then used to outline the melolabial interpolation flap.  The donor area for the pedicle flap was then injected with anesthesia.  The flap was excised through the skin and subcutaneous tissue down to the layer of the underlying musculature.  The pedicle flap was carefully excised within this deep plane to maintain its blood supply.  The edges of the donor site were undermined.   The donor site was closed in a primary fashion.  The pedicle was then rotated into position and sutured.  Once the tube was sutured into place, adequate blood supply was confirmed with blanching and refill.  The pedicle was then wrapped with xeroform gauze and dressed appropriately with a telfa and gauze bandage to ensure continued blood supply and protect the attached pedicle.
Purse String (Simple) Text: Given the location of the defect and the characteristics of the surrounding skin a purse string simple closure was deemed most appropriate.  Undermining was performed circumferentially around the surgical defect.  A purse string suture was then placed and tightened.
Rectangular Flap Text: The defect edges were debeveled with a #15 scalpel blade. Given the location of the defect and the proximity to free margins a rectangular flap was deemed most appropriate. Using a sterile surgical marker, an appropriate rectangular flap was drawn incorporating the defect. The area thus outlined was incised deep to adipose tissue with a #15 scalpel blade. The skin margins were undermined to an appropriate distance in all directions utilizing iris scissors. Following this, the designed flap was carried over into the primary defect and sutured into place.
Nasalis Myocutaneous Flap Text: Using a #15 blade, an incision was made around the donor flap to the level of the nasalis muscle. Wide lateral undermining was then performed in both the subcutaneous plane above the nasalis muscle, and in a submuscular plane just above periosteum. This allowed the formation of a free nasalis muscle axial pedicle which was still attached to the actual cutaneous flap, increasing its mobility and vascular viability. Hemostasis was obtained with pinpoint electrocoagulation. The flap was mobilized into position and the pivotal anchor points positioned and stabilized with buried interrupted sutures. Subcutaneous and dermal tissues were closed in a multilayered fashion with sutures. Tissue redundancies were excised, and the epidermal edges were apposed without significant tension and sutured with sutures.

## 2024-05-14 ENCOUNTER — APPOINTMENT (OUTPATIENT)
Dept: URBAN - METROPOLITAN AREA CLINIC 260 | Age: 49
Setting detail: DERMATOLOGY
End: 2024-05-14

## 2024-05-14 DIAGNOSIS — Z48.02 ENCOUNTER FOR REMOVAL OF SUTURES: ICD-10-CM

## 2024-05-14 PROCEDURE — OTHER PHOTO-DOCUMENTATION: OTHER

## 2024-05-14 PROCEDURE — OTHER SUTURE REMOVAL (GLOBAL PERIOD): OTHER

## 2024-05-14 ASSESSMENT — LOCATION ZONE DERM: LOCATION ZONE: FACE

## 2024-05-14 ASSESSMENT — LOCATION DETAILED DESCRIPTION DERM: LOCATION DETAILED: LEFT MEDIAL MANDIBULAR CHEEK

## 2024-05-14 ASSESSMENT — LOCATION SIMPLE DESCRIPTION DERM: LOCATION SIMPLE: LEFT CHEEK

## 2024-05-14 NOTE — PROCEDURE: SUTURE REMOVAL (GLOBAL PERIOD)
Detail Level: Detailed
Add 86326 Cpt? (Important Note: In 2017 The Use Of 11384 Is Being Tracked By Cms To Determine Future Global Period Reimbursement For Global Periods): no

## 2024-10-13 ENCOUNTER — HEALTH MAINTENANCE LETTER (OUTPATIENT)
Age: 49
End: 2024-10-13

## 2024-11-14 NOTE — PROGRESS NOTES
GENETIC COUNSELING CONSULTATION NOTE    Date of visit: Nov 15, 2024    Presenting Information:   Rita Soares is a 49 year old female referred to the [facility] due to a family history of dystrophinopathies. 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     Rita reports not major health concerns for herself thus far. She has a history of COPD and asthma with a history of smoking. She has had EKGs performed when she presented to the ED for chest pain/bronchitis in the past. She has never had an echocardiogram.     Family History: A three generation pedigree was obtained and scanned into the electronic medical record. The relevant portions are described below:    Children-   12 year old daughter who has a history of Crohn's disease, eosinophilic esophagitis, and low IgA.   Siblings-  Sister, Jannie, is 48 years old and had positive genetic testing for the familial DMD del exon 48 variant.  Jannie's daughter, Bigg, had positive genetic testing for the familial DMD del exon 48 variant. She has a 7 year old daughter, Dayanara, who is healthy. She has a  son who tested negative for the DMD variant. She has a 3 year old daughter, Tori, who was evaluated at [facility] for elevated CK, leg cramping, and ricket's. She had a neuromuscular panel which found the DMD del exon 48 variant.   Jannie's son, Sravan, is reported to have elevated CK. His DMD testing is pending.   Parents-  Mother, Keny, who is 67 years old and is a confirmed carrier of DMD del exon 48. Keny has a history of coronary artery disease, non-Hodgkin's lymphoma diagnosed at 42 and ovarian cancer diagnosed at 42.   Maternal Relatives-  Aunt Kendal, who is 64 years old and is a confirmed carrier of DMD del exon 48. She has a history of  hypothyroid, kidney stones, hernia repair, hysterectomy and oophorectomy due to cyst on ovary.    Her daughter, Marii, is 27 years old. She had a history of left leg pain which was attributed to growing pains, vitamin D deficiency, and IBS with GERD. She has no children  Aunt, Romina, is 53 years old is confirmed to carry the DMD variant. She has a history of Grave's disease diagnosed at 34, a hernia s/p repair, and had a hysterectomy and oophorectomy. Romina had a cardiac work-up after she was having some episodes of palpitations. She had an echocardiogram and 24 hour Holter monitor in Feb/March of 2019. Everything was normal at that time.   Her son, Bryon, is 29 years old and has a diagnosis of Asperger's but is otherwise healthy.   Her daughter, Patito, is 26 years old and is healthy.    Her son, Kirt, is 23 years old and is healthy.   Aunt, Jaimie, who is generally well. She has no children. She has not had any genetic testing.  Uncle, Willie, who has ulcerative colitis/IBD. He has two daughters who are healthy.   Maternal grandmother, Elton, is 88 and recently had genetic testing for the DMD variant which was negative.   Maternal grandfather presumably had the DMD variant. He passed away at age 79 due to congestive heart failure. The family reports he was still ambulatory when he passed away, although he was not one to seek medical care if he had concerns.     Family history is otherwise largely non-contributory. Consanguinity was denied.     Genetic Counseling Discussion:  Our bodies are made of cells that contain our chromosomes. We have 22 identical pairs of chromosomes numbered 1-22 and one pair of sex chromosomes called X and Y. Females have two X chromosomes and males have one X and one Y chromosome. Our chromosomes are made up of long stretches of DNA containing our genes. Our genes serve as the instructions for our bodies to grow and function. Our genes are divided into different numbered subunits called exons.      Dystrophinopathies:  Dystrophinopathies refer to a group of neuromuscular conditions  caused by changes (mutations) in dystrophin gene (DMD gene), which is located on the X chromosome.  There have been over 5000 different mutations described in the DMD gene. About 65-80% of mutations in the DMD gene are small deletions or duplications of exons in DMD. The remaining 20-35% of mutations are sequence changes/ point mutations. The dystrophin protein is essential for maintaining muscle strength.  If the dystrophin protein is not made properly or is missing, it causes muscle to breakdown at a rapid rate that causes muscle weakness.  Mutations in the dystrophin gene can also cause cardiomyopathy (a weakening of the heart muscles), scoliosis, respiratory problems, behavioral problems and learning disabilities. For this reason it is important for people with dystrophinopathies to have annual or biannual evaluations with a multidisciplinary care team that includes a Neurologist, Cardiologist, Pulmonologist, Physical Therapist, and sometimes an Orthopedist.      The dystrophinopathies cover a spectrum of muscle diseases that are called Duchenne muscular dystrophy (DMD), Sanabria muscular dystrophy (BMD), DMD-associated dilated cardiomyopathy (DCM), and hyperCKemia. DMD presents at younger ages and has a faster progression of muscle weakness with affected boys needing a wheelchair usually by age 12, while BMD tends to present later in childhood and has a slower progression of muscle weakness. Individuals with a dystrophinopathy are typically classified as having DMD or BMD based on their age of onset of muscle weakness, progression of their muscle weakness, and sometimes based on their specific mutation in the DMD gene.      As previously mentioned, it is important for males with a dystrophinopathy to be followed by a multidisciplinary team (Neurology, Cardiology, Pulmonology, Physical Therapy, Genetic Counseling, Dietitian) to treat symptoms and ensure they are getting the therapies they need. Males with DMD and  BMD are typically given steroid treatment (prednisone, deflazacort, or AGAMREE  (vamorolone) which was FDA approved in 2023) to help slow skeletal muscle damage and weakness. Many may also be started on an ACE inhibitor (or an angiotensin II-receptor blockers (ARBs) such as losartan) and/or a beta blocker to try to help improve left ventricular function of the heart.      While there is currently no cure for DMD/BMD there are several treatments that target the underlying genetic mechanism in DMD that are in clinical trial phases and some that have been FDA approved. These treatments have limitations based on the causative DMD variant:  Four treatments that have been FDA approved target exon skipping (Eteplirsen/Exondys 51, Casimersen/Amondys 45, Golodirsen/Vyondys 53, and Viltepso). By skipping the nonfunctional exons (using an antisense oligonucleotide), a shortened dystrophin protein can be produced which may have some functionality.    Ataluren is an oral small molecule treatment that allows for stop-codon read-through to produce dystrophin in patients with nonsense mutations. This drug has been in trials since 2014 and the company that makes this drug is still seeking FDA approval.   In June 2023 FDA approved ELEVIDYS which is an adeno-associated virus (AAV) vector-based gene therapy. This treatment is only FDA approved for ambulatory pediatric patients aged 4 through 5 years with Duchenne muscular dystrophy. ELEVIDYS is contraindicated in patients with any deletion in exon 8 and/or exon 9 in the DMD gene  There are several other companies with active clinical trials for other gene therapies and other targeted treatments for DMD/BMD     Inheritance:  The dystrophinopathies are inherited in an X-linked inheritance pattern. As previously mentioned, the DMD gene is on the X chromosome and because males only have one X chromosome, if they have a mutation in their DMD gene they will be affected. Males can inherit  "their DMD mutation from their mother or their mutation could be a new change in them for the first time (de dilan). Approximately 1/3 of boys with DMD/BMD have the condition as the result of a new mutation. Females who carry one DMD mutation are considered carriers. If a woman is a carrier for DMD/BMD, there is a 50% chance she will have an affected son and a 50% chance she will have a carrier daughter with each pregnancy. Males with severe forms of dystrophinopathies typically do not reproduce. Males with a milder dystrophinopathy would not pass their DMD mutation to any of their sons and they would pass their mutation to all of their daughters who would be carriers.     Female Carriers:  We discussed that small portion of female carriers can present with mild muscle weakness, cramps, and fatigue in adulthood but most female carriers (80-90%) remain asymptomatic. Carrier females are also at increased at risk of developing cardiomyopathy in adulthood. For this reason, cardiac evaluation and screening is recommended for women who are carriers for DMD/BMD. There are no specific guidelines about the age at which female carriers should start cardiac screening, but generally, it is thought that females should start cardiac screening in their late teens/early adulthood, and if normal, repeat every 3-5 years.      Rita's mother, Keny was found to have one pathogenic variant in the DMD gene, specifically a deletion of exon 48. This confirms that she is a carrier for dystrophinopathy. This is an \"in-frame\" deletion so we would expect this to be associated with a milder phenotype like Sanabria muscular dystrophy. When reviewing the literature, there have been many documented cases of individuals with a deletion of exon 48 and these all appear to be mild cases or cases of dilated cardiomyopathy. Some researcher have found that DMD deletions around exons 45 to 49, and around exons 51 and 52 were correlated with more severe " "cardiac phenotype and sometimes the deletion of exon 48 and/or 49 have been referred to as a \"cardiac deletion\". One article published by an [employer] group in January 2024 reported 7 individuals with deletions of only exon 48 and these individuals were either asymptomatic, had isolated elevated CK, or mild muscle weakness without elevated CK (Mirna, 2024).     Given the recent discovery that Rita's grandmother does not have the familial variant but all of her aunts do, we can assume that her grandfather had the DMD del exon 48. Since the family reports he was still ambulatory in his 70's, that provides further evidence that this particular variant may be more the the \"cardiac deletion\" with some sparing of other skeletal muscle function.     Because Rita's mother has the DMD deletion, she has a 50% chance of inheriting this familial DMD deletion.      Genetic Testing:  Genetic testing for Rita will sequence the DMD gene to analyze for the familial DMD mutation (deletion of ecxon 48). Possible results for this testing are positive (meaning Rita has the familial variant) or negative (meaning she does not have the variant).     We discussed the potential benefits of genetic testing and why this genetic testing is medically indicated. A positive result will help determine if Rita is a carrier for dystrophinopathies and will guide the medical management for her. She will be referred to Cardiology for monitoring for cardiomyopathy if she is a carrier. Also, if Rita is found to be a carrier, it will give us a more accurate risk assessment for other family members, particularly her daughter.     The recommended testing for Rita is DIAGNOSTIC testing, and it is NOT investigational.     Rita consented to genetic testing today. A buccal kit will be mailed to her home for sample collection. [employer] will bill Rita's insurance directly and overall cost may depend on the remaining benefits, deductible, and " co-insurances. Rita will receive a bill from [employer] and if the bill is >$100 a representative from [employer] will reach out and discuss their financial assistance programs and payment plans.     It was a pleasure meeting Rita today. She was encouraged to reach out to me if she has any further questions.     Plan:  [facility] Familial DMD Variant testing  Rita will be mailed a buccal kit for sample collection.   I will call her with results about 2-3 weeks after the lab receives her sample.       Mariama Rosado MS, [facility]  Licensed Genetic Counselor   [facility]  Phone: [phone]  Fax: [phone]    Time spent in consultation face to face was approximately 20 minutes.

## 2024-11-15 ENCOUNTER — VIRTUAL VISIT (OUTPATIENT)
Dept: CONSULT | Facility: CLINIC | Age: 49
End: 2024-11-15
Attending: GENETIC COUNSELOR, MS
Payer: COMMERCIAL

## 2024-11-15 DIAGNOSIS — Z71.83 ENCOUNTER FOR NONPROCREATIVE GENETIC COUNSELING: ICD-10-CM

## 2024-11-15 DIAGNOSIS — Z84.89 FAMILY HISTORY OF GENETIC DISEASE: Primary | ICD-10-CM

## 2024-11-15 PROCEDURE — 96040 HC GENETIC COUNSELING, EACH 30 MINUTES: CPT | Mod: GT,95 | Performed by: GENETIC COUNSELOR, MS

## 2024-11-15 NOTE — PATIENT INSTRUCTIONS
Genetics  [facility]     Contact our nurse care coordinator Marce OSHEAN, RN, PHN at [phone] or send a [employer] message for any non-urgent general or medical questions.     If you had genetic testing and have further questions, please contact the genetic counselor:    Mariama Rosado  Ph: [phone]    To schedule appointments:  Pediatric Call Center for [facility]: [phone]  Neuropsychology [phone]   Radiology/ Imaging/Echocardiogram: [phone]   Services:   [phone]     You should receive a phone call about your next appointment. If you do not receive this within two weeks of your visit, please call [phone].     IF REFERRALS WERE PLACED/ DISCUSSED DURING THE VISIT, PLEASE LET OUR TEAM KNOW IF YOU DO NOT HEAR FROM THE SCHEDULERS IN 2 WEEKS    If you have not already done so consider signing up for [employer] by speaking with the person at the  on your way out or go to [email or web address] to sign up online.     [employer] Access enables easy and confidential communication with your care team.

## 2024-11-15 NOTE — NURSING NOTE
How would you like to obtain your AVS? [email or web address]  If the video visit is dropped, the invitation should be resent by: Text to cell phone: [phone]  Will anyone else be joining your video visit? No

## 2024-11-15 NOTE — LETTER
11/15/2024      RE: Rita Soares  [address] [facility] [facility] [address]     Dear Colleague,    Thank you for the opportunity to participate in the care of your patient, Rita Soares, at the [facility]. Please see a copy of my visit note below.    GENETIC COUNSELING CONSULTATION NOTE    Date of visit: Nov 15, 2024    Presenting Information:   Rita Soares is a 49 year old female referred to the [facility] due to a family history of dystrophinopathies. 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     Rita reports not major health concerns for herself thus far. She has a history of COPD and asthma with a history of smoking. She has had EKGs performed when she presented to the ED for chest pain/bronchitis in the past. She has never had an echocardiogram.     Family History: A three generation pedigree was obtained and scanned into the electronic medical record. The relevant portions are described below:    Children-   12 year old daughter who has a history of Crohn's disease, eosinophilic esophagitis, and low IgA.   Siblings-  Sister, Jannie, is 48 years old and had positive genetic testing for the familial DMD del exon 48 variant.  Jannie's daughter, Bigg, had positive genetic testing for the familial DMD del exon 48 variant. She has a 7 year old daughter, Dayanara, who is healthy. She has a  son who tested negative for the DMD variant. She has a 3 year old daughter, Tori, who was evaluated at [facility] for elevated CK, leg cramping, and ricket's. She had a neuromuscular panel which found the DMD del exon 48 variant.   Jannie's son, Sravan, is reported to have elevated CK. His DMD testing is pending.   Parents-  Mother, Keny, who is 67 years old and is a confirmed carrier of  DMD del exon 48. Keny has a history of coronary artery disease, non-Hodgkin's lymphoma diagnosed at 42 and ovarian cancer diagnosed at 42.   Maternal Relatives-  Aunt Kendal, who is 64 years old and is a confirmed carrier of DMD del exon 48. She has a history of  hypothyroid, kidney stones, hernia repair, hysterectomy and oophorectomy due to cyst on ovary.   Her daughter, Marii, is 27 years old. She had a history of left leg pain which was attributed to growing pains, vitamin D deficiency, and IBS with GERD. She has no children  Aunt, Romina, is 53 years old is confirmed to carry the DMD variant. She has a history of Grave's disease diagnosed at 34, a hernia s/p repair, and had a hysterectomy and oophorectomy. Romina had a cardiac work-up after she was having some episodes of palpitations. She had an echocardiogram and 24 hour Holter monitor in Feb/March of 2019. Everything was normal at that time.   Her son, Bryon, is 29 years old and has a diagnosis of Asperger's but is otherwise healthy.   Her daughter, Patito, is 26 years old and is healthy.    Her son, Kirt, is 23 years old and is healthy.   Aunt, Jaimie, who is generally well. She has no children. She has not had any genetic testing.  Uncle, Willie, who has ulcerative colitis/IBD. He has two daughters who are healthy.   Maternal grandmother, Elton, is 88 and recently had genetic testing for the DMD variant which was negative.   Maternal grandfather presumably had the DMD variant. He passed away at age 79 due to congestive heart failure. The family reports he was still ambulatory when he passed away, although he was not one to seek medical care if he had concerns.     Family history is otherwise largely non-contributory. Consanguinity was denied.     Genetic Counseling Discussion:  Our bodies are made of cells that contain our chromosomes. We have 22 identical pairs of chromosomes numbered 1-22 and one pair of sex chromosomes called X and Y. Females have two X  chromosomes and males have one X and one Y chromosome. Our chromosomes are made up of long stretches of DNA containing our genes. Our genes serve as the instructions for our bodies to grow and function. Our genes are divided into different numbered subunits called exons.      Dystrophinopathies:  Dystrophinopathies refer to a group of neuromuscular conditions caused by changes (mutations) in dystrophin gene (DMD gene), which is located on the X chromosome.  There have been over 5000 different mutations described in the DMD gene. About 65-80% of mutations in the DMD gene are small deletions or duplications of exons in DMD. The remaining 20-35% of mutations are sequence changes/ point mutations. The dystrophin protein is essential for maintaining muscle strength.  If the dystrophin protein is not made properly or is missing, it causes muscle to breakdown at a rapid rate that causes muscle weakness.  Mutations in the dystrophin gene can also cause cardiomyopathy (a weakening of the heart muscles), scoliosis, respiratory problems, behavioral problems and learning disabilities. For this reason it is important for people with dystrophinopathies to have annual or biannual evaluations with a multidisciplinary care team that includes a Neurologist, Cardiologist, Pulmonologist, Physical Therapist, and sometimes an Orthopedist.      The dystrophinopathies cover a spectrum of muscle diseases that are called Duchenne muscular dystrophy (DMD), Sanabria muscular dystrophy (BMD), DMD-associated dilated cardiomyopathy (DCM), and hyperCKemia. DMD presents at younger ages and has a faster progression of muscle weakness with affected boys needing a wheelchair usually by age 12, while BMD tends to present later in childhood and has a slower progression of muscle weakness. Individuals with a dystrophinopathy are typically classified as having DMD or BMD based on their age of onset of muscle weakness, progression of their muscle weakness,  and sometimes based on their specific mutation in the DMD gene.      As previously mentioned, it is important for males with a dystrophinopathy to be followed by a multidisciplinary team (Neurology, Cardiology, Pulmonology, Physical Therapy, Genetic Counseling, Dietitian) to treat symptoms and ensure they are getting the therapies they need. Males with DMD and BMD are typically given steroid treatment (prednisone, deflazacort, or AGAMREE  (vamorolone) which was FDA approved in 2023) to help slow skeletal muscle damage and weakness. Many may also be started on an ACE inhibitor (or an angiotensin II-receptor blockers (ARBs) such as losartan) and/or a beta blocker to try to help improve left ventricular function of the heart.      While there is currently no cure for DMD/BMD there are several treatments that target the underlying genetic mechanism in DMD that are in clinical trial phases and some that have been FDA approved. These treatments have limitations based on the causative DMD variant:  Four treatments that have been FDA approved target exon skipping (Eteplirsen/Exondys 51, Casimersen/Amondys 45, Golodirsen/Vyondys 53, and Viltepso). By skipping the nonfunctional exons (using an antisense oligonucleotide), a shortened dystrophin protein can be produced which may have some functionality.    Ataluren is an oral small molecule treatment that allows for stop-codon read-through to produce dystrophin in patients with nonsense mutations. This drug has been in trials since 2014 and the company that makes this drug is still seeking FDA approval.   In June 2023 FDA approved ELEVIDYS which is an adeno-associated virus (AAV) vector-based gene therapy. This treatment is only FDA approved for ambulatory pediatric patients aged 4 through 5 years with Duchenne muscular dystrophy. ELEVIDYS is contraindicated in patients with any deletion in exon 8 and/or exon 9 in the DMD gene  There are several other companies with active  "clinical trials for other gene therapies and other targeted treatments for DMD/BMD     Inheritance:  The dystrophinopathies are inherited in an X-linked inheritance pattern. As previously mentioned, the DMD gene is on the X chromosome and because males only have one X chromosome, if they have a mutation in their DMD gene they will be affected. Males can inherit their DMD mutation from their mother or their mutation could be a new change in them for the first time (de dilan). Approximately 1/3 of boys with DMD/BMD have the condition as the result of a new mutation. Females who carry one DMD mutation are considered carriers. If a woman is a carrier for DMD/BMD, there is a 50% chance she will have an affected son and a 50% chance she will have a carrier daughter with each pregnancy. Males with severe forms of dystrophinopathies typically do not reproduce. Males with a milder dystrophinopathy would not pass their DMD mutation to any of their sons and they would pass their mutation to all of their daughters who would be carriers.     Female Carriers:  We discussed that small portion of female carriers can present with mild muscle weakness, cramps, and fatigue in adulthood but most female carriers (80-90%) remain asymptomatic. Carrier females are also at increased at risk of developing cardiomyopathy in adulthood. For this reason, cardiac evaluation and screening is recommended for women who are carriers for DMD/BMD. There are no specific guidelines about the age at which female carriers should start cardiac screening, but generally, it is thought that females should start cardiac screening in their late teens/early adulthood, and if normal, repeat every 3-5 years.      Rita's mother, Keny was found to have one pathogenic variant in the DMD gene, specifically a deletion of exon 48. This confirms that she is a carrier for dystrophinopathy. This is an \"in-frame\" deletion so we would expect this to be associated with a " "milder phenotype like Sanabria muscular dystrophy. When reviewing the literature, there have been many documented cases of individuals with a deletion of exon 48 and these all appear to be mild cases or cases of dilated cardiomyopathy. Some researcher have found that DMD deletions around exons 45 to 49, and around exons 51 and 52 were correlated with more severe cardiac phenotype and sometimes the deletion of exon 48 and/or 49 have been referred to as a \"cardiac deletion\". One article published by an [employer] group in January 2024 reported 7 individuals with deletions of only exon 48 and these individuals were either asymptomatic, had isolated elevated CK, or mild muscle weakness without elevated CK (Mirna, 2024).     Given the recent discovery that Rita's grandmother does not have the familial variant but all of her aunts do, we can assume that her grandfather had the DMD del exon 48. Since the family reports he was still ambulatory in his 70's, that provides further evidence that this particular variant may be more the the \"cardiac deletion\" with some sparing of other skeletal muscle function.     Because Rita's mother has the DMD deletion, she has a 50% chance of inheriting this familial DMD deletion.      Genetic Testing:  Genetic testing for Rita will sequence the DMD gene to analyze for the familial DMD mutation (deletion of ecxon 48). Possible results for this testing are positive (meaning Rita has the familial variant) or negative (meaning she does not have the variant).     We discussed the potential benefits of genetic testing and why this genetic testing is medically indicated. A positive result will help determine if Rita is a carrier for dystrophinopathies and will guide the medical management for her. She will be referred to Cardiology for monitoring for cardiomyopathy if she is a carrier. Also, if Rita is found to be a carrier, it will give us a more accurate risk assessment for other family " members, particularly her daughter.     The recommended testing for Rita is DIAGNOSTIC testing, and it is NOT investigational.     Rita consented to genetic testing today. A buccal kit will be mailed to her home for sample collection. [facility] will bill Rita's insurance directly and overall cost may depend on the remaining benefits, deductible, and co-insurances. Rita will receive a bill from [employer] and if the bill is >$100 a representative from [employer] [facility] will reach out and discuss their financial assistance programs and payment plans.     It was a pleasure meeting Rita today. She was encouraged to reach out to me if she has any further questions.     Plan:  [facility] Familial DMD Variant testing  Rita will be mailed a buccal kit for sample collection.   I will call her with results about 2-3 weeks after the lab receives her sample.       Mariama Rosado MS, [facility]  Licensed Genetic Counselor   [facility]  Phone: [phone]  Fax: [phone]    Time spent in consultation face to face was approximately 20 minutes.      Please do not hesitate to contact me if you have any questions/concerns.     Sincerely,       Jennifer Rosado, GC

## 2024-12-03 ENCOUNTER — TELEPHONE (OUTPATIENT)
Dept: CONSULT | Facility: CLINIC | Age: 49
End: 2024-12-03
Payer: COMMERCIAL

## 2024-12-03 DIAGNOSIS — Z15.89 MUTATION IN DMD GENE: Primary | ICD-10-CM

## 2024-12-03 DIAGNOSIS — Z84.89 FAMILY HISTORY OF GENETIC DISEASE: ICD-10-CM

## 2024-12-03 NOTE — TELEPHONE ENCOUNTER
I spoke with Rita and we reviewed the results of her genetic testing for the familial DMD variant. The results of Rita's testing are POSITIVE. 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         We discussed the necessary cardiac screening for Rita now that we know she is a carrier. She would like a referral to our specialists here at [facility] so I will place this today. We also discussed the pros and cons of testing for her daughter at her current age. I said it would be important to involve her daughter in the discussion given the reproductive implications. Rita will discuss with her daughter and call me if they are interested in testing now or when she is a little older and closer to the the recommended cardiac screening age (late teens-early adulthood).     I will mail Rita a copy of her test report to keep for her records. She can reach out anytime with additional questions.     Mariama Rosado MS, [facility]  Licensed Genetic Counselor  [facility]  Phone: [phone]  Fax: [phone]

## 2024-12-05 ENCOUNTER — TELEPHONE (OUTPATIENT)
Dept: CARDIOLOGY | Facility: CLINIC | Age: 49
End: 2024-12-05
Payer: COMMERCIAL

## 2024-12-05 NOTE — TELEPHONE ENCOUNTER
[facility] Call Center    Phone Message    May a detailed message be left on voicemail: yes     Reason for Call: Appointment Intake    Referring Provider Name:  Jennifer Rosado GC   Diagnosis and/or Symptoms: Mutation in DMD gene [Z15.89]; Family history of genetic disease [Z84.89]     ?New Congenital/Genetics. Diagnoses not in protocol. Please review and reach out to patient to schedule.     Action Taken: Message routed to:  [facility] Cardio    Travel Screening: Not Applicable     Date of Service:

## 2024-12-10 NOTE — TELEPHONE ENCOUNTER
[facility] Call Center    Phone Message    May a detailed message be left on voicemail: yes     Reason for Call: Other: Patient has not heard back yet from anyone in regards to getting them schedule. Patient has a referral for diagnosis of mutation in DMD gene and family history of genetic disease, and unsure if this should be congenital/genetics or gen card. Please review and call patient back to further coordinate.     Action Taken: Other: Cardiology    Travel Screening: Not Applicable     Thank you!  [facility]

## 2024-12-12 NOTE — TELEPHONE ENCOUNTER
New Muscular Dystrophy Referral       Demographics:  [address] [facility] [facility] [address] [address]   Home Phone [phone]   Mobile [phone]        Referred By:   Jennifer Rosado GC     Background:  Patient has a referral for diagnosis of mutation in DMD gene and family history of genetic disease. Referred to see Dr. Brown    Insurance:   [employer]    Plan:   Offer next available with Dr. Brown. 1/27 @ 5pm. Overbook ok by RN.     Attempts to Contact:   December 12, 2024 - left voicemail

## 2024-12-16 NOTE — TELEPHONE ENCOUNTER
12/16/24 - Pt called back to schedule with Dr. Brown.  Offered appt on 1/27/24 at 5:00.  Overbook approved by Dr. Brown.

## 2024-12-19 NOTE — TELEPHONE ENCOUNTER
RECORDS RECEIVED FROM:    DATE RECEIVED:    GENERAL RECORDS STATUS DETAILS   OFFICE NOTE from cardiologists N/A    LABS Care Everywhere    EKG (STRIPS & REPORTS) Internal 12-2-22   MONITORS (STRIPS & REPORTS) N/A    ECHOS (IMAGES AND REPORTS) N/A    MUSCULAR DYSTROPHY     MEDICATIONS (LAST 3 YEARS) Internal    CARDIAC MRI (LAST 3 YEARS) N/A

## 2024-12-21 ENCOUNTER — HEALTH MAINTENANCE LETTER (OUTPATIENT)
Age: 49
End: 2024-12-21

## 2025-01-16 DIAGNOSIS — Z15.89 MUTATION IN DMD GENE: Primary | ICD-10-CM

## 2025-01-16 DIAGNOSIS — Z13.6 ENCOUNTER FOR SCREENING FOR CARDIOVASCULAR DISORDERS: ICD-10-CM

## 2025-01-27 ENCOUNTER — PRE VISIT (OUTPATIENT)
Dept: CARDIOLOGY | Facility: CLINIC | Age: 50
End: 2025-01-27
Payer: COMMERCIAL

## 2025-01-27 ENCOUNTER — OFFICE VISIT (OUTPATIENT)
Dept: CARDIOLOGY | Facility: CLINIC | Age: 50
End: 2025-01-27
Attending: INTERNAL MEDICINE
Payer: COMMERCIAL

## 2025-01-27 ENCOUNTER — LAB (OUTPATIENT)
Dept: LAB | Facility: CLINIC | Age: 50
End: 2025-01-27
Payer: COMMERCIAL

## 2025-01-27 VITALS
HEART RATE: 70 BPM | BODY MASS INDEX: 32.12 KG/M2 | SYSTOLIC BLOOD PRESSURE: 153 MMHG | DIASTOLIC BLOOD PRESSURE: 96 MMHG | WEIGHT: 187.1 LBS | OXYGEN SATURATION: 97 %

## 2025-01-27 DIAGNOSIS — Z13.6 ENCOUNTER FOR SCREENING FOR CARDIOVASCULAR DISORDERS: ICD-10-CM

## 2025-01-27 DIAGNOSIS — Z84.89 FAMILY HISTORY OF GENETIC DISEASE: ICD-10-CM

## 2025-01-27 DIAGNOSIS — Z15.89 MUTATION IN DMD GENE: ICD-10-CM

## 2025-01-27 DIAGNOSIS — R00.2 PALPITATIONS: Primary | ICD-10-CM

## 2025-01-27 LAB
ALBUMIN SERPL BCG-MCNC: 4.2 G/DL (ref 3.5–5.2)
ALP SERPL-CCNC: 101 U/L (ref 40–150)
ALT SERPL W P-5'-P-CCNC: 17 U/L (ref 0–50)
ANION GAP SERPL CALCULATED.3IONS-SCNC: 8 MMOL/L (ref 7–15)
AST SERPL W P-5'-P-CCNC: 17 U/L (ref 0–45)
BILIRUB SERPL-MCNC: 0.2 MG/DL
BUN SERPL-MCNC: 14.4 MG/DL (ref 6–20)
CALCIUM SERPL-MCNC: 9.2 MG/DL (ref 8.8–10.4)
CHLORIDE SERPL-SCNC: 102 MMOL/L (ref 98–107)
CHOLEST SERPL-MCNC: 192 MG/DL
CK SERPL-CCNC: 77 U/L (ref 26–192)
CREAT SERPL-MCNC: 0.84 MG/DL (ref 0.51–0.95)
EGFRCR SERPLBLD CKD-EPI 2021: 86 ML/MIN/1.73M2
ERYTHROCYTE [DISTWIDTH] IN BLOOD BY AUTOMATED COUNT: 13.3 % (ref 10–15)
FASTING STATUS PATIENT QL REPORTED: ABNORMAL
FASTING STATUS PATIENT QL REPORTED: NORMAL
GLUCOSE SERPL-MCNC: 94 MG/DL (ref 70–99)
HCO3 SERPL-SCNC: 28 MMOL/L (ref 22–29)
HCT VFR BLD AUTO: 43 % (ref 35–47)
HDLC SERPL-MCNC: 48 MG/DL
HGB BLD-MCNC: 14.2 G/DL (ref 11.7–15.7)
LDLC SERPL CALC-MCNC: 120 MG/DL
MAGNESIUM SERPL-MCNC: 2.3 MG/DL (ref 1.7–2.3)
MCH RBC QN AUTO: 29.9 PG (ref 26.5–33)
MCHC RBC AUTO-ENTMCNC: 33 G/DL (ref 31.5–36.5)
MCV RBC AUTO: 91 FL (ref 78–100)
NONHDLC SERPL-MCNC: 144 MG/DL
NT-PROBNP SERPL-MCNC: 61 PG/ML (ref 0–450)
PLATELET # BLD AUTO: 257 10E3/UL (ref 150–450)
POTASSIUM SERPL-SCNC: 5.1 MMOL/L (ref 3.4–5.3)
PROT SERPL-MCNC: 6.6 G/DL (ref 6.4–8.3)
RBC # BLD AUTO: 4.75 10E6/UL (ref 3.8–5.2)
SODIUM SERPL-SCNC: 138 MMOL/L (ref 135–145)
TRIGL SERPL-MCNC: 119 MG/DL
TROPONIN T SERPL HS-MCNC: 7 NG/L
WBC # BLD AUTO: 12.2 10E3/UL (ref 4–11)

## 2025-01-27 PROCEDURE — 84484 ASSAY OF TROPONIN QUANT: CPT | Performed by: PATHOLOGY

## 2025-01-27 PROCEDURE — 80053 COMPREHEN METABOLIC PANEL: CPT | Performed by: PATHOLOGY

## 2025-01-27 PROCEDURE — 36415 COLL VENOUS BLD VENIPUNCTURE: CPT | Performed by: PATHOLOGY

## 2025-01-27 PROCEDURE — 99213 OFFICE O/P EST LOW 20 MIN: CPT | Performed by: INTERNAL MEDICINE

## 2025-01-27 PROCEDURE — 80061 LIPID PANEL: CPT | Performed by: PATHOLOGY

## 2025-01-27 PROCEDURE — 85027 COMPLETE CBC AUTOMATED: CPT | Performed by: PATHOLOGY

## 2025-01-27 PROCEDURE — 93242 EXT ECG>48HR<7D RECORDING: CPT | Performed by: INTERNAL MEDICINE

## 2025-01-27 PROCEDURE — 82550 ASSAY OF CK (CPK): CPT | Performed by: PATHOLOGY

## 2025-01-27 PROCEDURE — 83735 ASSAY OF MAGNESIUM: CPT | Performed by: PATHOLOGY

## 2025-01-27 PROCEDURE — 83880 ASSAY OF NATRIURETIC PEPTIDE: CPT | Performed by: PATHOLOGY

## 2025-01-27 PROCEDURE — 99205 OFFICE O/P NEW HI 60 MIN: CPT | Performed by: INTERNAL MEDICINE

## 2025-01-27 PROCEDURE — 93005 ELECTROCARDIOGRAM TRACING: CPT

## 2025-01-27 RX ORDER — CITALOPRAM HYDROBROMIDE 10 MG/1
10 TABLET ORAL DAILY
COMMUNITY
Start: 2024-12-06

## 2025-01-27 RX ORDER — BUPROPION HYDROCHLORIDE 150 MG/1
150 TABLET, EXTENDED RELEASE ORAL 2 TIMES DAILY
COMMUNITY
Start: 2024-06-25

## 2025-01-27 RX ORDER — CELECOXIB 200 MG/1
200 CAPSULE ORAL 2 TIMES DAILY
COMMUNITY
Start: 2024-06-25

## 2025-01-27 ASSESSMENT — PAIN SCALES - GENERAL: PAINLEVEL_OUTOF10: NO PAIN (0)

## 2025-01-27 NOTE — NURSING NOTE
Cardiac Monitors: Patient was instructed regarding the indication, function, care and prompt return of a zio patch monitor. The monitor was placed on the patient with instructions regarding care of the skin electrodes and monitor, as well as documentation in the patient diary. Patient demonstrated understanding of this information and agreed to call with further questions or concerns.    Cardiac Testing: Patient given instructions regarding CPX, cardiac MRI. Discussed purpose, preparation, procedure and when to expect results reported back to the patient. Patient demonstrated understanding of this information and agreed to call with further questions or concerns.    Labs: Patient was given results of the laboratory testing obtained today. Patient demonstrated understanding of this information and agreed to call with further questions or concerns.     Med Reconcile: Reviewed and verified all current medications with the patient. The updated medication list was printed and given to the patient.    Return Appointment: Patient given instructions regarding scheduling next clinic visit. Patient demonstrated understanding of this information and agreed to call with further questions or concerns. Annual follow up with Dr. Brown.    Patient stated she understood all health information given and agreed to call with further questions or concerns.    Melissa Espinoza RN

## 2025-01-27 NOTE — PROGRESS NOTES
Neurocardiomyopathy Clinic Consultation     Name: Rita Soares  : 1975  MRN: [id number]    2025    Dear Ms. Rosado, and Ms. 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 She has multiple family members including her niece, sister, her mother, her maternal aunts with the DMD variant, which prompted her genetic testing.  Overall she feels well she works at the [employer] in [place].  She is quite active at work and walks considerably while she is at work.  She denies any exertional chest pain.  She did have some chest tightness at rest a few days ago which lasted the whole day and would start and stop but has not previously happened.  She checked her blood pressure at home and blood pressure is normal.  She has been noted to have higher blood pressures but denies a history of type 2 diabetes, dyslipidemia, family history of premature coronary artery disease.  She is quitting smoking is down to 9 cigarettes/day but has a previous 1-1/2 pack/day history for approximately 30 years.  She has no muscle weakness or muscle pain.  She denies difficulties keeping up with her peers as a child or now as an adult.  Her weight is stable, she has no edema, no orthopnea, no PND.  She sleeps approximately 6 hours at night and has a hard time falling asleep.  She has not been noted to snore.  She has palpitations approximately once every 5 months or so the last less than few minutes.  She denies lightheadedness, presyncope or syncope.  She has a good appetite and her energy levels are variable she does note that she has some depression which her primary care PA is helping to manage.  She has one 12-year-old daughter  who has not yet been tested for the dystrophin mutation but she does note that she has some muscle pain.  When she was pregnant she had no history of gestational diabetes, hypertension, preeclampsia or eclampsia.  She did have 1 miscarriage.    REVIEW OF SYSTEMS: 10 point ROS neg other than the symptoms noted above in the HPI.    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 Rosado's note and updated as above. A three generation pedigree was obtained and scanned into the electronic medical record. The relevant portions are described below:     Children-   12 year old daughter who has a history of Crohn's disease, eosinophilic esophagitis, and low IgA.   Siblings-  Sister, Jannie, is 48 years old and had positive genetic testing for the familial DMD del exon 48 variant.  Jannie's daughter, Bigg, had positive genetic testing for the familial DMD del exon 48 variant. She has a 7 year old daughter, Dayanara, who is healthy. She has a  son who tested negative for the DMD variant. She has a 3 year old daughter, Tori, who was evaluated at [facility] for elevated CK, leg cramping, and ricket's. She had a neuromuscular panel which found the DMD del exon 48 variant.   Jannie's son, Sravan, is reported to have elevated CK. His DMD testing is pending.   Parents-  Mother, Keny, who is 67 years old and is a confirmed carrier of DMD del exon 48. Keny has a history of coronary artery disease, non-Hodgkin's lymphoma diagnosed at 42 and ovarian cancer diagnosed at 42.   Maternal Relatives-  Aunt Kendal, who is 64 years old and is a confirmed carrier of DMD del exon 48. She has a history of  hypothyroid, kidney stones, hernia repair, hysterectomy and oophorectomy due to cyst on ovary.   Her daughter, Marii, is 27 years old. She had a history of left leg pain which was attributed to growing  pains, vitamin D deficiency, and IBS with GERD. She has no children  Aunt, Romina, is 53 years old is confirmed to carry the DMD variant. She has a history of Grave's disease diagnosed at 34, a hernia s/p repair, and had a hysterectomy and oophorectomy. Romina had a cardiac work-up after she was having some episodes of palpitations. She had an echocardiogram and 24 hour Holter monitor in Feb/March of 2019. Everything was normal at that time.   Her son, Bryon, is 29 years old and has a diagnosis of Asperger's but is otherwise healthy.   Her daughter, Patito, is 26 years old and is healthy.    Her son, Kirt, is 23 years old and is healthy.   Aunt, Jaimie, who is generally well. She has no children. She has not had any genetic testing.  Uncle, Willie, who has ulcerative colitis/IBD. He has two daughters who are healthy.   Maternal grandmother, Elton, is 88 and recently had genetic testing for the DMD variant which was negative.   Maternal grandfather presumably had the DMD variant. He passed away at age 79 due to congestive heart failure. The family reports he was still ambulatory when he passed away, although he was not one to seek medical care if he had concerns.     ALLERGIES:    Allergies   Allergen Reactions    Gluten Meal Nausea and Vomiting and Diarrhea       MEDICATIONS:   Current Outpatient Medications   Medication Sig Dispense Refill    albuterol (PROAIR HFA/PROVENTIL HFA/VENTOLIN HFA) 108 (90 Base) MCG/ACT inhaler Inhale 2 puffs into the lungs every 6 hours as needed for shortness of breath / dyspnea or wheezing 18 g 0    ipratropium - albuterol 0.5 mg/2.5 mg/3 mL (DUONEB) 0.5-2.5 (3) MG/3ML neb solution Take 1 vial (3 mLs) by nebulization every 6 hours as needed for shortness of breath / dyspnea or wheezing 90 mL 0    NO ACTIVE MEDICATIONS       Prenatal vitamin  s (DIS) TABS Take 1 tablet by mouth daily 90 tablet 3    varenicline (CHANTIX STARTING MONTH PAK) 0.5 MG X 11 & 1 MG X 42 tablet Take 0.5 mg tab daily  for 3 days, then 0.5 mg tab twice daily for 4 days, then 1 mg twice daily. 53 tablet 0    varenicline (CHANTIX) 1 MG tablet Take 1 tablet by mouth 2 times daily. 56 tablet 2     No current facility-administered medications for this visit.       SOCIAL HISTORY: She lives in [place]. She is . She works for [employer].   Tobacco: 9 cigarettes per day. Prior 1.5 pack/day history for 30 years  Alcohol: 1 drink per month or less  Illicits: denies     PHYSICAL EXAM:   BP (!) 154/99 (BP Location: Right arm, Patient Position: Chair, Cuff Size: Adult Regular)   Pulse 70   Wt 84.9 kg (187 lb 1.6 oz)   SpO2 97%   BMI 32.12 kg/m    General: comfortable, conversant, NAD  HEENT: normocephalic, atraumatic, anicteric  Neck: Estimate JVP <7, normal carotid upstroke  CV: RRR, nl s1 and s2, no murmurs, gallops, or rubs   Lungs: CTAB, no crackles or wheezes, normal work of breathing  Abdomen: BS+, soft, non tender, non distended,   Extremities: warm and well perfused, no edema  Neuro: normal speech and gait, grossly normal upper and lower body strength     DIAGNOSTIC TESTING:    Latest Reference Range & Units 01/27/25 16:29   Sodium 135 - 145 mmol/L 138   Potassium 3.4 - 5.3 mmol/L 5.1   Chloride 98 - 107 mmol/L 102   Carbon Dioxide (CO2) 22 - 29 mmol/L 28   Urea Nitrogen 6.0 - 20.0 mg/dL 14.4   Creatinine 0.51 - 0.95 mg/dL 0.84   GFR Estimate >60 mL/min/1.73m2 86   Calcium 8.8 - 10.4 mg/dL 9.2   Anion Gap 7 - 15 mmol/L 8   Magnesium 1.7 - 2.3 mg/dL 2.3   Albumin 3.5 - 5.2 g/dL 4.2   Protein Total 6.4 - 8.3 g/dL 6.6   Alkaline Phosphatase 40 - 150 U/L 101   ALT 0 - 50 U/L 17   AST 0 - 45 U/L 17   Bilirubin Total <=1.2 mg/dL 0.2   Cholesterol <200 mg/dL 192   CK Total 26 - 192 U/L 77   Patient Fasting?  Unknown  Unknown   Glucose 70 - 99 mg/dL 94   HDL Cholesterol >=50 mg/dL 48 (L)   LDL Cholesterol Calculated <100 mg/dL 120 (H)   Non HDL Cholesterol <130 mg/dL 144 (H)   N-Terminal Pro Bnp 0 - 450 pg/mL 61   Triglycerides <150 mg/dL  119   Troponin T, High Sensitivity <=14 ng/L 7   WBC 4.0 - 11.0 10e3/uL 12.2 (H)   Hemoglobin 11.7 - 15.7 g/dL 14.2   Hematocrit 35.0 - 47.0 % 43.0   Platelet Count 150 - 450 10e3/uL 257   RBC Count 3.80 - 5.20 10e6/uL 4.75   MCV 78 - 100 fL 91   MCH 26.5 - 33.0 pg 29.9   MCHC 31.5 - 36.5 g/dL 33.0   RDW 10.0 - 15.0 % 13.3   (L): Data is abnormally low  (H): Data is abnormally high        ASSESSMENT AND PLAN: Ms. Rita Soares is a very pleasant 49-year-old woman who is a carrier of Duchenne muscular dystrophy and who presents for cardiovascular screening.     1. DMD carrier, exon 48 deletion   2.  Elevated blood pressure  3.  Tobacco use    I discussed the pathophysiology of dystrophinopathy carriers including random X inactivation and screening for DMD carrier manifestation including skeletal and cardiac involvement with her.  Overall she has good functional capacity and no significant muscle weakness or pain and I am not referring her to neuromuscular neurology at this time.  She also has a normal CK level.  From a cardiovascular perspective she is euvolemic and well compensated.  Her blood pressure is elevated today which we will repeat.  Recommended that she checks home blood pressures a few times a week and if her blood pressures consistently over 130/80 she could start a low-dose ARB such as losartan 12 and half milligrams daily, which can also be started by her primary care PA.  If she has hypertensive and ARB can provide some benefit of antifibrotic effect in addition to the antihypertensive effect.  She otherwise has normal endorgan function.  Both her N-terminal proBNP and high-sensitivity cardiac troponin T are within normal limits.  We discussed that cardiovascular involvement can include conduction system issues as well as dysrhythmias and cardiomyopathy.  Today we will obtain an ECG and have her wear a Zio patch monitor to assess for dysrhythmias.  I would like her to have a cardiac MRI with  contrast to assess for function and fibrosis.  She should also have a cardiopulmonary exercise stress test to assess for cardiac reserve which can be altered in DMD carriers.     I also commended her on her reduction in tobacco use and goals towards cessation.    The majority of her family members have undergone screening. She is discussing genetic testing for her daughter. I have let her know to discuss her diagnosis with her daughter's pediatrician for screening including a CK.     We discussed the importance of routine screening with annual visits.     PLAN:   -repeat BP   -ECG  -Ziopatch monitor  -CMR with contrast  -CPEX  -annual visit     62 minutes on DOS for chart review, patient history and exam, counseling, review of diagnostic testing, coordination of care and documentation.     Discussed with patient the Muscular Dystrophy Biorepository at the [facility]. This is a research study involving the collection of bio samples (blood, etc) and medical history of patients with muscular dystrophies. Patient is agreeable to receiving contact from our study coordination with additional information. Questions answered.     Thank you for allowing me to participate in the care of your patient. Please do not hesitate to contact me if you have any questions.     Sincerely,   Forum     Forum MD Stephanie, PhD, FACC  Advanced Heart Failure/Transplantation/MCS  [facility]

## 2025-01-27 NOTE — PATIENT INSTRUCTIONS
"You were seen today in the Cardiovascular Clinic at the [facility].      Cardiology Providers you saw during your visit:  Dr. Darwin Brown     Recommendations:   EKG today in clinic.  Keep track of BP at home - let your PCP or us know if it is consistently above 140/90.  We will place a Zio patch (portable cardiac monitor) on today in clinic. You will wear this for 7 days and then mail it in. It can take up to 3 weeks to get the results back from your Zio patch after it has been mailed back in. We will contact you when we receive and review the results.   Cardiac MRI - call [phone] if you need to reschedule.  Cardiopulmonary stress test - call [phone] if you need to reschedule.  Follow up with Dr. Brown in 1 year with labs prior.       Thank you for your visit today!   Please MyChart message or call if you have any questions or concerns.      During Business Hours:  [phone], option # 1 \"To leave a message for your care team\"     After hours, weekends or holidays:   [phone], Option #4  Ask to speak to the On-Call Cardiologist. Inform them you are a heart failure patient at the [place].      Melissa Espinoza RN BSN   Cardiology Nurse Coordinator - Heart Failure/[facility]  [phone] option 1 to schedule an appointment or leave a message for your care team    Pre-procedure instructions - Cardiac MRI with Contrast, Stress, and Flow  Patient Education    Your arrival time is ________.  Location is [address] Waiting [facility]    How do I prepare for my exam? (Food and drink instructions)  Stop all caffeine 12 hours before the test. This includes coffee, tea, soda, chocolate and certain medicines (such as Anacin, Excedrin and NoDoz). Also avoid decaf coffee and tea, as these contain small amounts of caffeine.  You may drink water and take your morning " medicines.  (Other Instructions)  You may need to stop some medicines before the test. Follow your doctor's orders.  You will need to arrive 1 hour early if you will be taking an anxiety medication for the test.     *2 days before:  Stop taking dipyridamole (Aggrenox, Persantine, Permole)  Stop taking Sildenafil (Viagra), Tadalafil (Cialis) and Vardenafil (Levitra).  If you are taking the medication for Pulmonary Hypertesnion DO NOT hold.     12 hours before:  Stop taking Theophylline (Theolair) & Aminophylline     What Should I wear: The MRI machine uses a strong magnet. Due to increased risk of metallic materials/threading in clothing, for your safety, you will be requested to change into a hospital gown. Please remove any body piercings and hair or magnetic eyelash extensions before you arrive. You will also remove watches, jewelry, hairpins, wallets, dentures, partial dental plates and hearing aids. You may wear contact lenses, and you may be able to wear your rings. We have a safe place to keep your personal items, but it is safer to leave them at home.     How long does the Exam Take: Most tests take up to 90 minutes.       What Should I Bring: If you have any implants please bring a card or surgical report with the implant information.  We may be unable to scan you if we do not have this information. Bring the results of similar scans you may have had previously. If you are a minor (under age 18) you will need to bring a parent or legal.     Do I need a : No     What Should I do after the Exam:  No restrictions, you may resume normal activities.     What is this test:  MRI (magnetic resonance imaging) uses a strong magnet and radio waves to look inside the body. An MRA (magnetic resonance angiogram) does the same thing, but it lets us look at your blood vessels. A computer turns the radio waves into pictures showing cross sections of the body, much like slices of bread. This helps us see any problems  more clearly. You may receive fluid (called  contrast ) before or during your scan. The fluid helps us see the pictures better. We give the fluid through an IV (small needle in your arm).    CardioPulmonary Stress Test (CPX)  CPX is a maximal (meaning you exercise to exhaustion, not to achieve a heart rate) exercise test where we measure how well your heart/body uses oxygen or energy. It is the gold standard for measuring functional capacity and helps us differentiate limitations due to lungs, heart, or fitness.   A CPX is NOT a typical stress test. You will NOT be asked to hold your Beta Blocker medication.     You will be scheduled for a CardioPulmonary Stress Test at the [facility] ([address], [facility] [address], [phone]).       Follow these instructions:    1. Report to the GOLD waiting room in the [facility] hospital on: __________    2. Nothing to eat for 3 hours prior to your test. You may have clear liquids up to the time of your test    3. Please wear loose, two-piece clothing and comfortable, rubber soled shoes for walking     For Patients with Diabetes: Your RN Coordinator will give you instructions on adjusting your diabetic medications for the day of your test                           If you have questions please contact your diabetic care team                           Remember to  bring your glucometer & insulin with you to take after your test if needed

## 2025-01-27 NOTE — LETTER
2025      RE: Rita Soares  [address] [address]       Dear Colleague,    Thank you for the opportunity to participate in the care of your patient, Rita Soares, at the [facility] HEART [facility] [place] at [facility]. Please see a copy of my visit note below.    Neurocardiomyopathy Clinic Consultation     Name: Rita Soares  : 1975  MRN: [id number]    2025    Dear Ms. Rosado, and Ms. Keenan and colleagues,    I had the pleasure of seeing Rita Soares, a 49 year old female today  in the [facility] Neuromuscular Cardiomyopathy Clinic for cardiovascular evaluation in the setting of DMD carrier status.     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 She has multiple family members including her niece, sister, her mother, her maternal aunts with the DMD variant, which prompted her genetic testing.  Overall she feels well she works at the [employer] in [employer].  She is quite active at work and walks considerably while she is at work.  She denies any exertional chest pain.  She did have some chest tightness at rest a few days ago which lasted the whole day and would start and stop but has not previously happened.  She checked her blood pressure at home and blood pressure is normal.  She has been noted to have higher blood pressures but denies a history of type 2 diabetes, dyslipidemia, family history of premature coronary artery disease.  She is quitting smoking is down to 9 cigarettes/day but has a previous 1-1/2 pack/day history for approximately 30 years.  She has no muscle weakness or muscle pain.  She denies difficulties keeping up with her peers as a child or now as an adult.  Her weight is stable, she has no edema, no orthopnea, no PND.  She sleeps approximately 6 hours at night and has a hard time falling asleep.  She has not  been noted to snore.  She has palpitations approximately once every 5 months or so the last less than few minutes.  She denies lightheadedness, presyncope or syncope.  She has a good appetite and her energy levels are variable she does note that she has some depression which her primary care PA is helping to manage.  She has one 12-year-old daughter who has not yet been tested for the dystrophin mutation but she does note that she has some muscle pain.  When she was pregnant she had no history of gestational diabetes, hypertension, preeclampsia or eclampsia.  She did have 1 miscarriage.    REVIEW OF SYSTEMS: 10 point ROS neg other than the symptoms noted above in the HPI.    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 Danielsonjennifer's note and updated as above. A three generation pedigree was obtained and scanned into the electronic medical record. The relevant portions are described below:     Children-   12 year old daughter who has a history of Crohn's disease, eosinophilic esophagitis, and low IgA.   Siblings-  Sister, Jannie, is 48 years old and had positive genetic testing for the familial DMD del exon 48 variant.  Jannie's daughter, Bigg, had positive genetic testing for the familial DMD del exon 48 variant. She has a 7 year old daughter, Dayanara, who is healthy. She has a  son who tested negative for the DMD variant. She has a 3 year old daughter, Tori, who was evaluated at [facility] for elevated CK, leg cramping, and ricket's. She had a neuromuscular panel which found the DMD del exon 48 variant.   Jannie's son, Sravan, is reported to have elevated CK. His DMD testing is pending.   Parents-  Mother, Keny, who is 67 years old and is a confirmed carrier of DMD del exon 48. Keny has a history of coronary artery disease, non-Hodgkin's lymphoma diagnosed at 42 and ovarian  cancer diagnosed at 42.   Maternal Relatives-  Aunt Kendal, who is 64 years old and is a confirmed carrier of DMD del exon 48. She has a history of  hypothyroid, kidney stones, hernia repair, hysterectomy and oophorectomy due to cyst on ovary.   Her daughter, Marii, is 27 years old. She had a history of left leg pain which was attributed to growing pains, vitamin D deficiency, and IBS with GERD. She has no children  Aunt, Romina, is 53 years old is confirmed to carry the DMD variant. She has a history of Grave's disease diagnosed at 34, a hernia s/p repair, and had a hysterectomy and oophorectomy. Romina had a cardiac work-up after she was having some episodes of palpitations. She had an echocardiogram and 24 hour Holter monitor in Feb/March of 2019. Everything was normal at that time.   Her son, Bryon, is 29 years old and has a diagnosis of Asperger's but is otherwise healthy.   Her daughter, Patito, is 26 years old and is healthy.    Her son, Kirt, is 23 years old and is healthy.   Aunt, Jaimie, who is generally well. She has no children. She has not had any genetic testing.  Uncle, Willie, who has ulcerative colitis/IBD. He has two daughters who are healthy.   Maternal grandmother, Elton, is 88 and recently had genetic testing for the DMD variant which was negative.   Maternal grandfather presumably had the DMD variant. He passed away at age 79 due to congestive heart failure. The family reports he was still ambulatory when he passed away, although he was not one to seek medical care if he had concerns.     ALLERGIES:    Allergies   Allergen Reactions     Gluten Meal Nausea and Vomiting and Diarrhea       MEDICATIONS:   Current Outpatient Medications   Medication Sig Dispense Refill     albuterol (PROAIR HFA/PROVENTIL HFA/VENTOLIN HFA) 108 (90 Base) MCG/ACT inhaler Inhale 2 puffs into the lungs every 6 hours as needed for shortness of breath / dyspnea or wheezing 18 g 0     ipratropium - albuterol 0.5 mg/2.5 mg/3 mL  (DUONEB) 0.5-2.5 (3) MG/3ML neb solution Take 1 vial (3 mLs) by nebulization every 6 hours as needed for shortness of breath / dyspnea or wheezing 90 mL 0     NO ACTIVE MEDICATIONS        Prenatal vitamin  s (DIS) TABS Take 1 tablet by mouth daily 90 tablet 3     varenicline (CHANTIX STARTING MONTH DONNELL) 0.5 MG X 11 & 1 MG X 42 tablet Take 0.5 mg tab daily for 3 days, then 0.5 mg tab twice daily for 4 days, then 1 mg twice daily. 53 tablet 0     varenicline (CHANTIX) 1 MG tablet Take 1 tablet by mouth 2 times daily. 56 tablet 2     No current facility-administered medications for this visit.       SOCIAL HISTORY: She lives in [place]. She is . She works for [employer].   Tobacco: 9 cigarettes per day. Prior 1.5 pack/day history for 30 years  Alcohol: 1 drink per month or less  Illicits: denies     PHYSICAL EXAM:   BP (!) 154/99 (BP Location: Right arm, Patient Position: Chair, Cuff Size: Adult Regular)   Pulse 70   Wt 84.9 kg (187 lb 1.6 oz)   SpO2 97%   BMI 32.12 kg/m    General: comfortable, conversant, NAD  HEENT: normocephalic, atraumatic, anicteric  Neck: Estimate JVP <7, normal carotid upstroke  CV: RRR, nl s1 and s2, no murmurs, gallops, or rubs   Lungs: CTAB, no crackles or wheezes, normal work of breathing  Abdomen: BS+, soft, non tender, non distended,   Extremities: warm and well perfused, no edema  Neuro: normal speech and gait, grossly normal upper and lower body strength     DIAGNOSTIC TESTING:    Latest Reference Range & Units 01/27/25 16:29   Sodium 135 - 145 mmol/L 138   Potassium 3.4 - 5.3 mmol/L 5.1   Chloride 98 - 107 mmol/L 102   Carbon Dioxide (CO2) 22 - 29 mmol/L 28   Urea Nitrogen 6.0 - 20.0 mg/dL 14.4   Creatinine 0.51 - 0.95 mg/dL 0.84   GFR Estimate >60 mL/min/1.73m2 86   Calcium 8.8 - 10.4 mg/dL 9.2   Anion Gap 7 - 15 mmol/L 8   Magnesium 1.7 - 2.3 mg/dL 2.3   Albumin 3.5 - 5.2 g/dL 4.2   Protein Total 6.4 - 8.3 g/dL 6.6   Alkaline Phosphatase 40 - 150 U/L 101   ALT 0 - 50 U/L 17    AST 0 - 45 U/L 17   Bilirubin Total <=1.2 mg/dL 0.2   Cholesterol <200 mg/dL 192   CK Total 26 - 192 U/L 77   Patient Fasting?  Unknown  Unknown   Glucose 70 - 99 mg/dL 94   HDL Cholesterol >=50 mg/dL 48 (L)   LDL Cholesterol Calculated <100 mg/dL 120 (H)   Non HDL Cholesterol <130 mg/dL 144 (H)   N-Terminal Pro Bnp 0 - 450 pg/mL 61   Triglycerides <150 mg/dL 119   Troponin T, High Sensitivity <=14 ng/L 7   WBC 4.0 - 11.0 10e3/uL 12.2 (H)   Hemoglobin 11.7 - 15.7 g/dL 14.2   Hematocrit 35.0 - 47.0 % 43.0   Platelet Count 150 - 450 10e3/uL 257   RBC Count 3.80 - 5.20 10e6/uL 4.75   MCV 78 - 100 fL 91   MCH 26.5 - 33.0 pg 29.9   MCHC 31.5 - 36.5 g/dL 33.0   RDW 10.0 - 15.0 % 13.3   (L): Data is abnormally low  (H): Data is abnormally high        ASSESSMENT AND PLAN: Ms. Rita Soares is a very pleasant 49-year-old woman who is a carrier of Duchenne muscular dystrophy and who presents for cardiovascular screening.     1. DMD carrier, exon 48 deletion   2.  Elevated blood pressure  3.  Tobacco use    I discussed the pathophysiology of dystrophinopathy carriers including random X inactivation and screening for DMD carrier manifestation including skeletal and cardiac involvement with her.  Overall she has good functional capacity and no significant muscle weakness or pain and I am not referring her to neuromuscular neurology at this time.  She also has a normal CK level.  From a cardiovascular perspective she is euvolemic and well compensated.  Her blood pressure is elevated today which we will repeat.  Recommended that she checks home blood pressures a few times a week and if her blood pressures consistently over 130/80 she could start a low-dose ARB such as losartan 12 and half milligrams daily, which can also be started by her primary care PA.  If she has hypertensive and ARB can provide some benefit of antifibrotic effect in addition to the antihypertensive effect.  She otherwise has normal endorgan function.   Both her N-terminal proBNP and high-sensitivity cardiac troponin T are within normal limits.  We discussed that cardiovascular involvement can include conduction system issues as well as dysrhythmias and cardiomyopathy.  Today we will obtain an ECG and have her wear a Zio patch monitor to assess for dysrhythmias.  I would like her to have a cardiac MRI with contrast to assess for function and fibrosis.  She should also have a cardiopulmonary exercise stress test to assess for cardiac reserve which can be altered in DMD carriers.     I also commended her on her reduction in tobacco use and goals towards cessation.    The majority of her family members have undergone screening. She is discussing genetic testing for her daughter. I have let her know to discuss her diagnosis with her daughter's pediatrician for screening including a CK.     We discussed the importance of routine screening with annual visits.     PLAN:   -repeat BP   -ECG  -Ziopatch monitor  -CMR with contrast  -CPEX  -annual visit     62 minutes on DOS for chart review, patient history and exam, counseling, review of diagnostic testing, coordination of care and documentation.     Discussed with patient the Muscular Dystrophy Biorepository at the [facility]. This is a research study involving the collection of bio samples (blood, etc) and medical history of patients with muscular dystrophies. Patient is agreeable to receiving contact from our study coordination with additional information. Questions answered.     Thank you for allowing me to participate in the care of your patient. Please do not hesitate to contact me if you have any questions.     Sincerely,   Darwin Brown MD, PhD, [facility]  Advanced Heart Failure/Transplantation/MCS  [facility]/[employer]      Rita Soares arrived here on 1/27/2025 6:10 PM for 3-7 Days  Zio monitor placement per ordering provider Dr. Brown for the diagnosis palpitations [R00.2].    Rand is the supervising MD. Patient s skin was prepped per protocol.  Zio monitor was placed.  Instructions were reviewed with and given to the patient.  Patient verbalized understanding of wear, troubleshooting and monitor return instructions.      Please do not hesitate to contact me if you have any questions/concerns.     Sincerely,     Darwin Brown MD

## 2025-01-28 ENCOUNTER — TELEPHONE (OUTPATIENT)
Dept: CARDIOLOGY | Facility: CLINIC | Age: 50
End: 2025-01-28
Payer: COMMERCIAL

## 2025-01-28 LAB
ATRIAL RATE - MUSE: 69 BPM
DIASTOLIC BLOOD PRESSURE - MUSE: NORMAL MMHG
INTERPRETATION ECG - MUSE: NORMAL
P AXIS - MUSE: 53 DEGREES
PR INTERVAL - MUSE: 164 MS
QRS DURATION - MUSE: 96 MS
QT - MUSE: 392 MS
QTC - MUSE: 420 MS
R AXIS - MUSE: 8 DEGREES
SYSTOLIC BLOOD PRESSURE - MUSE: NORMAL MMHG
T AXIS - MUSE: 15 DEGREES
VENTRICULAR RATE- MUSE: 69 BPM

## 2025-01-28 NOTE — PROGRESS NOTES
Rita Soares arrived here on 1/27/2025 6:10 PM for 3-7 Days  Zio monitor placement per ordering provider Dr. Brown for the diagnosis palpitations [R00.2].  Dr. Gordillo is the supervising MD. Patient s skin was prepped per protocol.  Zio monitor was placed.  Instructions were reviewed with and given to the patient.  Patient verbalized understanding of wear, troubleshooting and monitor return instructions.

## 2025-01-28 NOTE — TELEPHONE ENCOUNTER
Left Voicemail (1st Attempt) for the patient to call back and schedule the following:    Appointment type:  CPX  Provider: WILLY   Return date: NEXT AVILABLE   Specialty phone number: [phone] OPT 1   Additional appointment(s) needed: CARDIAC MRI   Additonal Notes: N/A

## 2025-02-20 LAB — CV ZIO PRELIM RESULTS: NORMAL

## 2025-04-17 ENCOUNTER — HOSPITAL ENCOUNTER (OUTPATIENT)
Dept: MRI IMAGING | Facility: CLINIC | Age: 50
Discharge: HOME | End: 2025-04-17
Attending: INTERNAL MEDICINE
Payer: COMMERCIAL

## 2025-04-17 ENCOUNTER — HOSPITAL ENCOUNTER (OUTPATIENT)
Dept: CARDIOLOGY | Facility: CLINIC | Age: 50
Discharge: HOME | End: 2025-04-17
Attending: INTERNAL MEDICINE
Payer: COMMERCIAL

## 2025-04-17 VITALS
WEIGHT: 194 LBS | BODY MASS INDEX: 33.12 KG/M2 | SYSTOLIC BLOOD PRESSURE: 118 MMHG | HEIGHT: 64 IN | HEART RATE: 70 BPM | DIASTOLIC BLOOD PRESSURE: 94 MMHG

## 2025-04-17 DIAGNOSIS — Z15.89 MUTATION IN DMD GENE: ICD-10-CM

## 2025-04-17 DIAGNOSIS — Z13.6 ENCOUNTER FOR SCREENING FOR CARDIOVASCULAR DISORDERS: ICD-10-CM

## 2025-04-17 LAB
CARDIOPULMONARY ANAEROBIC THRESHOLD PREDICTED PEAK: 67 %
CARDIOPULMONARY ANAEROBIC THRESHOLD VO2: 19.7 ML/KG/MIN
CARDIOPULMONARY BLOOD PRESSURE REST: NORMAL MMHG
CARDIOPULMONARY BREATHING RESERVE REST: 89.4
CARDIOPULMONARY BREATHING RESERVE V02MAX: 21
CARDIOPULMONARY CO2 OUTPUT REST: 264 ML/MIN
CARDIOPULMONARY CO2 OUTPUT VO2MAX: 2558 ML/MIN
CARDIOPULMONARY FEV 1.0 (L) ACTUAL: 2.44
CARDIOPULMONARY FEV 1.0 (L) PRECENT: 85 %
CARDIOPULMONARY FEV 1.0 (L) PREDICTED: 2.87
CARDIOPULMONARY FEV 1.0 FVC (%) ACTUAL: 83.8
CARDIOPULMONARY FEV 1.0 FVC (%) PERCENT: 104 %
CARDIOPULMONARY FEV 1.0 FVC (%) PREDICTED: 80.8
CARDIOPULMONARY FUNCTIONAL CAPACITY MAX ML/KG/MIN: 23.3 ML/KG/MIN
CARDIOPULMONARY FUNCTIONAL CAPACITY PERCENT: 80 %
CARDIOPULMONARY FUNCTIONAL CAPACITY PREDICTED: 29.3 ML/KG/MIN
CARDIOPULMONARY FVC (L) ACTUAL: 2.92
CARDIOPULMONARY FVC (L) PERCENT: 81 %
CARDIOPULMONARY FVC (L) PREDICTED: 3.6
CARDIOPULMONARY HEART RATE REST: 71 BPM
CARDIOPULMONARY MET'S REST: 1
CARDIOPULMONARY MINUTE VENTILATION REST: 9.1 L/MIN
CARDIOPULMONARY MINUTE VENTILATION VO2MAX: 67.3 L/MIN
CARDIOPULMONARY MYOCARDIAC O2 DEMAND MAX: NORMAL
CARDIOPULMONARY OXYGEN CONSUMPTION REST: 3.6 ML/KG/MIN
CARDIOPULMONARY OXYGEN CONSUMPTION VO2MAX: 23.3 ML/KG/MIN
CARDIOPULMONARY OXYGEN PULSE REST: 4 ML/BEAT
CARDIOPULMONARY OXYGEN PULSE VO2MAX: 12.1 ML/BEAT
CARDIOPULMONARY OXYGEN SATURATION- OXIMETRY REST: 100 %
CARDIOPULMONARY OXYGEN SATURATION- OXIMETRY VO2MAX: 99 %
CARDIOPULMONARY PET C02 REST: 37
CARDIOPULMONARY PET C02 VO2MAX: 42
CARDIOPULMONARY PET02 REST: 99
CARDIOPULMONARY PET02 V02 MAX: 104
CARDIOPULMONARY RER: 1.18
CARDIOPULMONARY RESPIRALORY EXCHANGE RATIO VO2MAX: 1.18
CARDIOPULMONARY RESPIRALORY EXCHANGE RATIO: 0.84
CARDIOPULMONARY RESPIRATORY RATE REST: 16 BR/MIN
CARDIOPULMONARY RESPIRATORY RATE VO2MAX: 39 BR/MIN
CARDIOPULMONARY STRESS BASE 1 BP MMHG: NORMAL MMHG
CARDIOPULMONARY STRESS BASE 1 BPA: 132 BPM
CARDIOPULMONARY STRESS BASE 1 SPO2: 99 % SPO2
CARDIOPULMONARY STRESS BASE 1 TIME SEC: 0 SEC
CARDIOPULMONARY STRESS BASE 1 TIME: 1 MINS
CARDIOPULMONARY STRESS BASE 2 BP MMHG: NORMAL MMHG
CARDIOPULMONARY STRESS BASE 2 BPA: 106 BPM
CARDIOPULMONARY STRESS BASE 2 SPO2: 100 % SPO2
CARDIOPULMONARY STRESS BASE 2 TIME SEC: 0 SEC
CARDIOPULMONARY STRESS BASE 2 TIME: 3 MINS
CARDIOPULMONARY STRESS BASE 3 BP MMHG: NORMAL MMHG
CARDIOPULMONARY STRESS BASE 3 BPA: 94 BPM
CARDIOPULMONARY STRESS BASE 3 SPO2: 100 % SPO2
CARDIOPULMONARY STRESS BASE 3 TIME SEC: 0 SEC
CARDIOPULMONARY STRESS BASE 3 TIME: 5 MINS
CARDIOPULMONARY STRESS PHASE 1 BP MMHG: NORMAL MMHG
CARDIOPULMONARY STRESS PHASE 1 BPM: 123 BPM
CARDIOPULMONARY STRESS PHASE 1 SPO2: 99 % SPO2
CARDIOPULMONARY STRESS PHASE 1 TIME SEC: 0 SEC
CARDIOPULMONARY STRESS PHASE 1 TIME: 3 MINS
CARDIOPULMONARY STRESS PHASE 2 BP MMHG: NORMAL MMHG
CARDIOPULMONARY STRESS PHASE 2 BPM: 151 BPM
CARDIOPULMONARY STRESS PHASE 2 SPO2: 100 % SPO2
CARDIOPULMONARY STRESS PHASE 2 TIME SEC: 0 SEC
CARDIOPULMONARY STRESS PHASE 2 TIME: 6 MINS
CARDIOPULMONARY STRESS PHASE 3 BP MMHG: NORMAL MMHG
CARDIOPULMONARY STRESS PHASE 3 BPM: 169 BPM
CARDIOPULMONARY STRESS PHASE 3 SPO2: 99 % SPO2
CARDIOPULMONARY STRESS PHASE 3 TIME SEC: 1 SEC
CARDIOPULMONARY STRESS PHASE 3 TIME: 8 MINS
CARDIOPULMONARY SVC (L) ACTUAL: 2.85
CARDIOPULMONARY SVC (L) PERCENT: 79 %
CARDIOPULMONARY SVC (L) PREDICTED: 3.6
CARDIOPULMONARY TIDAL VOLUME REST: 569 ML
CARDIOPULMONARY TIDAL VOLUME VO2MAX: 1742 ML
CARDIOPULMONARY VE/VCO2 SLOPE: 25.59
CARDIOPULMONARY VENTILATORY EQUIVALENT 02 REST: 29
CARDIOPULMONARY VENTILATORY EQUIVALENT 02 V02: 30
CARDIOPULMONARY VENTILATORY EQUIVALENT C02 REST: 34
CARDIOPULMONARY VENTILATORY EQUIVALENT C02 SLOPE VO2MAX: 25.59
CARDIOPULMONARY VENTILATORY EQUIVALENT C02 VO2MAX: 26
CV STRESS MAX HR HE: 169
PREDICTED VO2MAX: 29.3
STRESS ANGINA INDEX: 0
STRESS ECHO BASELINE BP: NORMAL MMHG
STRESS ECHO BASELINE HR: 70 BPM
STRESS ECHO CALCULATED PERCENT HR: 98 %
STRESS ECHO LAST STRESS BP: NORMAL MMHG
STRESS ECHO POST ESTIMATED WORKLOAD: 6.7 METS
STRESS ECHO POST EXERCISE DUR MIN: 8 MIN
STRESS ECHO POST EXERCISE DUR SEC: 1 SEC
STRESS ECHO TARGET HR: 173

## 2025-04-17 PROCEDURE — 255N000002 HC RX 255 OP 636: Performed by: INTERNAL MEDICINE

## 2025-04-17 PROCEDURE — 75561 CARDIAC MRI FOR MORPH W/DYE: CPT

## 2025-04-17 PROCEDURE — 94621 CARDIOPULM EXERCISE TESTING: CPT

## 2025-04-17 PROCEDURE — A9585 GADOBUTROL INJECTION: HCPCS | Performed by: INTERNAL MEDICINE

## 2025-04-17 RX ORDER — GADOBUTROL 604.72 MG/ML
10 INJECTION INTRAVENOUS ONCE
Status: COMPLETED | OUTPATIENT
Start: 2025-04-17 | End: 2025-04-17

## 2025-04-17 RX ADMIN — GADOBUTROL 10 ML: 604.72 INJECTION INTRAVENOUS at 07:57

## 2025-05-24 ENCOUNTER — HEALTH MAINTENANCE LETTER (OUTPATIENT)
Age: 50
End: 2025-05-24